# Patient Record
Sex: FEMALE | Race: WHITE | NOT HISPANIC OR LATINO | Employment: FULL TIME | URBAN - METROPOLITAN AREA
[De-identification: names, ages, dates, MRNs, and addresses within clinical notes are randomized per-mention and may not be internally consistent; named-entity substitution may affect disease eponyms.]

---

## 2018-06-09 ENCOUNTER — OFFICE VISIT (OUTPATIENT)
Dept: URGENT CARE | Facility: MEDICAL CENTER | Age: 63
End: 2018-06-09
Payer: COMMERCIAL

## 2018-06-09 VITALS
DIASTOLIC BLOOD PRESSURE: 70 MMHG | OXYGEN SATURATION: 93 % | HEART RATE: 96 BPM | RESPIRATION RATE: 20 BRPM | SYSTOLIC BLOOD PRESSURE: 134 MMHG | TEMPERATURE: 99.7 F

## 2018-06-09 DIAGNOSIS — J45.901 EXACERBATION OF ASTHMA, UNSPECIFIED ASTHMA SEVERITY, UNSPECIFIED WHETHER PERSISTENT: Primary | ICD-10-CM

## 2018-06-09 PROCEDURE — 99203 OFFICE O/P NEW LOW 30 MIN: CPT | Performed by: FAMILY MEDICINE

## 2018-06-09 PROCEDURE — 94640 AIRWAY INHALATION TREATMENT: CPT | Performed by: FAMILY MEDICINE

## 2018-06-09 RX ORDER — ALBUTEROL SULFATE 90 UG/1
AEROSOL, METERED RESPIRATORY (INHALATION)
COMMUNITY
Start: 2013-10-11

## 2018-06-09 RX ORDER — BENZONATATE 100 MG/1
100 CAPSULE ORAL 3 TIMES DAILY PRN
Qty: 20 CAPSULE | Refills: 0 | Status: SHIPPED | OUTPATIENT
Start: 2018-06-09 | End: 2018-10-01

## 2018-06-09 RX ORDER — ALBUTEROL SULFATE 2.5 MG/3ML
2.5 SOLUTION RESPIRATORY (INHALATION) ONCE
Status: COMPLETED | OUTPATIENT
Start: 2018-06-09 | End: 2018-06-09

## 2018-06-09 RX ORDER — FLUTICASONE PROPIONATE 44 UG/1
2 AEROSOL, METERED RESPIRATORY (INHALATION) 2 TIMES DAILY
Qty: 1 INHALER | Refills: 0 | Status: SHIPPED | OUTPATIENT
Start: 2018-06-09 | End: 2021-05-25

## 2018-06-09 RX ORDER — DULOXETIN HYDROCHLORIDE 60 MG/1
20 CAPSULE, DELAYED RELEASE ORAL DAILY
COMMUNITY
End: 2021-05-25

## 2018-06-09 RX ADMIN — ALBUTEROL SULFATE 2.5 MG: 2.5 SOLUTION RESPIRATORY (INHALATION) at 14:50

## 2018-06-09 NOTE — PATIENT INSTRUCTIONS
Patient given albuterol nebulizer treatment in the office  After treatment, she refers to symptomatic improvement  Re auscultation reveals increased air entry with no wheezing  Patient started on Flovent HFA 44 mcg per inhalation -2 puffs twice a day, continue with albuterol inhaler  I also prescribed Tessalon Perles q 8 hours  Recommended patient consider using Flonase spray if congestion worsens  Asthma   WHAT YOU NEED TO KNOW:   Asthma is a lung disease that makes breathing difficult  Chronic inflammation and reactions to triggers narrow the airways in the lungs  Asthma can become life-threatening if it is not managed  DISCHARGE INSTRUCTIONS:   Return to the emergency department if:   · You have severe shortness of breath  · Your lips or nails turn blue or gray  · The skin around your neck and ribs pulls in with each breath  · You have shortness of breath, even after you take your short-term medicine as directed  · Your peak flow numbers are in the red zone of your AAP  Contact your healthcare provider if:   · You run out of medicine before your next refill is due  · Your symptoms get worse  · You need to take more medicine than usual to control your symptoms  · You have questions or concerns about your condition or care  Medicines:   · Medicines  decrease inflammation, open airways, and make it easier to breathe  Medicines may be inhaled, taken as a pill, or injected  Short-term medicines relieve your symptoms quickly  Long-term medicines are used to prevent future attacks  You may also need medicine to help control your allergies  Ask your healthcare provider for more information about the medicine you are given and how to take it safely  · Take your medicine as directed  Contact your healthcare provider if you think your medicine is not helping or if you have side effects  Tell him of her if you are allergic to any medicine   Keep a list of the medicines, vitamins, and herbs you take  Include the amounts, and when and why you take them  Bring the list or the pill bottles to follow-up visits  Carry your medicine list with you in case of an emergency  Follow up with your healthcare provider as directed: You will need to return to make sure your medicine is working and your symptoms are controlled  You may be referred to an asthma specialist  Sydni Cardenas may be asked to keep a record of your peak flow values and bring it with you to your appointments  Write down your questions so you remember to ask them during your visits  Manage your symptoms and prevent future attacks:   · Follow your Asthma Action Plan (AAP)  This is a written plan that you and your healthcare provider create  It explains which medicine you need and when to change doses if necessary  It also explains how you can monitor symptoms and use a peak flow meter  The meter measures how well your lungs are working  · Manage other health conditions , such as allergies, acid reflux, and sleep apnea  · Identify and avoid triggers  These may include pets, dust mites, mold, and cockroaches  · Do not smoke or be around others who smoke  Nicotine and other chemicals in cigarettes and cigars can cause lung damage  Ask your healthcare provider for information if you currently smoke and need help to quit  E-cigarettes or smokeless tobacco still contain nicotine  Talk to your healthcare provider before you use these products  · Ask about the flu vaccine  The flu can make your asthma worse  You may need a yearly flu shot  © 2017 [employer] Information is for End User's use only and may not be sold, redistributed or otherwise used for commercial purposes  All illustrations and images included in CareNotes® are the copyrighted property of [employer] , [email or web address]  or [employer]  The above information is an  only   It is not intended as medical advice for individual conditions or treatments  Talk to your doctor, nurse or pharmacist before following any medical regimen to see if it is safe and effective for you

## 2018-06-09 NOTE — PROGRESS NOTES
[facility]        NAME: Melody Trinidad is a 60 y o  female  : 1955    MRN: [id number]  DATE: 2018  TIME: 3:14 PM    Assessment and Plan   Exacerbation of asthma, unspecified asthma severity, unspecified whether persistent [J45 [id number]]  1  Exacerbation of asthma, unspecified asthma severity, unspecified whether persistent  albuterol inhalation solution 2 5 mg    fluticasone (FLOVENT HFA) 44 mcg/act inhaler    benzonatate (TESSALON PERLES) 100 mg capsule         Patient Instructions       Follow up with PCP in 3-5 days  Proceed to  ER if symptoms worsen  Chief Complaint     Chief Complaint   Patient presents with    Cough     Pt with cough , chest congestion, chest tightness, runny nose, chills , bodyaches worsening for 3 days  Taking albuterol inhaler without improvement of symptoms   Fever         History of Present Illness       Patient complaining of 3 day history of cough shortness of breath or wheezing  She has a known history of asthma and has been using albuterol inhaler every 4-6 hours with no noticeable improvement  She is also taking cough drops with no improvement  Describes nasal congestion and postnasal drip  Review of Systems   Review of Systems   Constitutional: Negative  HENT: Positive for congestion  Respiratory: Positive for cough, shortness of breath and wheezing  Current Medications       Current Outpatient Prescriptions:     albuterol (VENTOLIN HFA) 90 mcg/act inhaler, Inhale, Disp: , Rfl:     DULoxetine (CYMBALTA) 60 mg delayed release capsule, Take 20 mg by mouth daily, Disp: , Rfl:     benzonatate (TESSALON PERLES) 100 mg capsule, Take 1 capsule (100 mg total) by mouth 3 (three) times a day as needed for cough, Disp: 20 capsule, Rfl: 0    fluticasone (FLOVENT HFA) 44 mcg/act inhaler, Inhale 2 puffs 2 (two) times a day Rinse mouth after use , Disp: 1 Inhaler, Rfl: 0  No current facility-administered medications for this visit  Current Allergies     Allergies as of 06/09/2018 - never reviewed   Allergen Reaction Noted    Penicillins  06/08/2012    Sulfamethoxazole-trimethoprim  07/09/2012            The following portions of the patient's history were reviewed and updated as appropriate: allergies, current medications, past family history, past medical history, past social history, past surgical history and problem list      Past Medical History:   Diagnosis Date    Allergic        No past surgical history on file  No family history on file  Medications have been verified  Objective   /70 (BP Location: Right arm, Patient Position: Sitting, Cuff Size: Standard)   Pulse 96   Temp 99 7 °F (37 6 °C) (Tympanic)   Resp 20   SpO2 93%        Physical Exam     Physical Exam   Constitutional: She appears well-developed  HENT:   Hypertrophic turbinates  Pulmonary/Chest: Effort normal  She has wheezes  Nursing note and vitals reviewed

## 2018-10-01 ENCOUNTER — OFFICE VISIT (OUTPATIENT)
Dept: OBGYN CLINIC | Facility: CLINIC | Age: 63
End: 2018-10-01
Payer: COMMERCIAL

## 2018-10-01 VITALS
HEIGHT: 62 IN | SYSTOLIC BLOOD PRESSURE: 120 MMHG | BODY MASS INDEX: 31.5 KG/M2 | WEIGHT: 171.2 LBS | DIASTOLIC BLOOD PRESSURE: 80 MMHG

## 2018-10-01 DIAGNOSIS — N64.4 BREAST PAIN, RIGHT: Primary | ICD-10-CM

## 2018-10-01 PROBLEM — J45.909 ASTHMA: Status: ACTIVE | Noted: 2018-04-19

## 2018-10-01 PROBLEM — R07.9 CHEST PAIN: Status: ACTIVE | Noted: 2018-04-19

## 2018-10-01 PROBLEM — K21.9 GERD (GASTROESOPHAGEAL REFLUX DISEASE): Status: ACTIVE | Noted: 2018-04-19

## 2018-10-01 PROCEDURE — 99202 OFFICE O/P NEW SF 15 MIN: CPT | Performed by: OBSTETRICS & GYNECOLOGY

## 2018-10-01 RX ORDER — MONTELUKAST SODIUM 10 MG/1
10 TABLET ORAL
COMMUNITY

## 2018-10-01 RX ORDER — AMITRIPTYLINE HYDROCHLORIDE 50 MG/1
25 TABLET, FILM COATED ORAL
COMMUNITY
End: 2021-05-25

## 2018-10-01 RX ORDER — ALPRAZOLAM 0.25 MG/1
0.25 TABLET, ORALLY DISINTEGRATING ORAL
COMMUNITY
End: 2021-05-25

## 2018-10-01 NOTE — PROGRESS NOTES
Assessment/Plan:      Diagnoses and all orders for this visit:    Breast pain, right  Comments: The patient was very resistant to the point where she flat at refuses to have a mammogram   Will try diagnostic ultrasound 1st   Orders:  -     US breast right limited (diagnostic); Future    Other orders  -     montelukast (SINGULAIR) 10 mg tablet; Take 10 mg by mouth daily at bedtime  -     ALPRAZolam (NIRAVAM) 0 25 MG dissolvable tablet; Take 0 25 mg by mouth daily at bedtime as needed for anxiety  -     amitriptyline (ELAVIL) 50 mg tablet; Take 25 mg by mouth daily at bedtime          Subjective:     Patient ID: Miguel Chatman is a 60 y o  female  [facility] presents today with a several month history of right breast pain  The patient states the pain started when she caught her right breast between 2 pieces of furniture  Since that time she has had pain off and on in and around the right at areolar area and surrounding tissue  She denies any palpable masses, observable nipple discharge, or skin changes  She refuses to have a mammogram and has not had 1 since age 35 because of the pain that she had gotten at that time  Review of Systems   Constitutional: Negative  Negative for chills and fever  Respiratory: Negative  Cardiovascular: Negative  Gastrointestinal: Negative  Negative for abdominal pain, blood in stool, constipation, diarrhea and nausea  Genitourinary: Negative  Negative for difficulty urinating, dysuria, flank pain, hematuria and urgency  Skin: Negative  Negative for rash and wound  Neurological: Negative  Objective:     Physical Exam  the breasts are symmetric, without palpable mass, skin change or nipple discharge  The right breast is tender especially when palpated between the 5 and 7 o'clock areas  No axillary or clavicular masses are noted

## 2018-10-08 ENCOUNTER — HOSPITAL ENCOUNTER (OUTPATIENT)
Dept: MAMMOGRAPHY | Facility: CLINIC | Age: 63
Discharge: HOME | End: 2018-10-08
Payer: COMMERCIAL

## 2018-10-08 ENCOUNTER — HOSPITAL ENCOUNTER (OUTPATIENT)
Dept: ULTRASOUND IMAGING | Facility: CLINIC | Age: 63
Discharge: HOME | End: 2018-10-08
Payer: COMMERCIAL

## 2018-10-08 DIAGNOSIS — N63.0 LUMP OR MASS IN BREAST: ICD-10-CM

## 2018-10-08 DIAGNOSIS — N64.4 BREAST PAIN: ICD-10-CM

## 2018-10-08 DIAGNOSIS — N64.4 BREAST PAIN, RIGHT: ICD-10-CM

## 2018-10-08 PROCEDURE — 76642 ULTRASOUND BREAST LIMITED: CPT

## 2018-10-08 PROCEDURE — 77066 DX MAMMO INCL CAD BI: CPT

## 2018-10-08 PROCEDURE — G0279 TOMOSYNTHESIS, MAMMO: HCPCS

## 2019-01-21 ENCOUNTER — EVALUATION (OUTPATIENT)
Dept: PHYSICAL THERAPY | Facility: CLINIC | Age: 64
End: 2019-01-21
Payer: COMMERCIAL

## 2019-01-21 DIAGNOSIS — M54.12 CERVICAL RADICULOPATHY: Primary | ICD-10-CM

## 2019-01-21 PROCEDURE — 97112 NEUROMUSCULAR REEDUCATION: CPT

## 2019-01-21 PROCEDURE — 97140 MANUAL THERAPY 1/> REGIONS: CPT

## 2019-01-21 PROCEDURE — 97162 PT EVAL MOD COMPLEX 30 MIN: CPT

## 2019-01-21 NOTE — PROGRESS NOTES
PT Evaluation     Today's date: 2019  Patient name: Marco Antonio Villarreal  :   MRN: [id number]  Referring provider: Amador Saeed DO  Dx:   Encounter Diagnosis     ICD-10-CM    1  Cervical radiculopathy M54 12        Start Time: 1215  Stop Time: 1300  Total time in clinic (min): 45 minutes    Assessment  Assessment details: Patient is a 57 yo female presenting with symptoms consistent with cervical radiculopathy and cervicogenic headaches that started a few weeks ago  Patient presents with decreased cervical AROM, joint hypomobility, decreased cervical strength and decreased muscle endurance  Patient has increased symptoms with dressing, driving, sleeping, looking to the L and working  PT performed sub-occipital release and cervical up-glides and noted improvements in ROM and pain levels  PT will address the noted impairments by performing eccentric strengthening, stretching, posture,  training, and manual technique such as mobilizations and STM to allow the patient to return to her PLOF  PT recommended 2x/week for 4-6 weeks c a good prognosis 2* noted improvements after manual techniques were performed  Impairments: abnormal muscle firing, abnormal or restricted ROM, activity intolerance, impaired physical strength, lacks appropriate home exercise program, pain with function, poor posture  and poor body mechanics    Symptom irritability: moderateUnderstanding of Dx/Px/POC: good   Prognosis: good    Goals  STG: In four weeks the patient will:    1  Be (I) with her HEP  2  Increase shoulder and cervical strength to 4+/5 MMT score to assist c ADLs  3  Increase cervical ROM by 25% to assist c driving and work  LTG: In six weeks, the patient will:    1  Increase FOTO score to 56 to demonstrate improvements in symptoms and function  2  Demonstrate full cervical AROM without pain  3  Perform 30 mins of activity without pain     4  Increase shoulder and cervical strength to 5/5 MMT score to assist c work related activities  5  Lift 10-20# without neck pain  6  Work a 12 hour shift with 1-2/10 pain in the neck and UEs  Plan  Patient would benefit from: skilled physical therapy  Referral necessary: No  Planned modality interventions: cryotherapy and thermotherapy: hydrocollator packs  Planned therapy interventions: abdominal trunk stabilization, joint mobilization, manual therapy, massage, Silveira taping, neuromuscular re-education, patient education, postural training, strengthening, stretching, therapeutic activities, therapeutic exercise, home exercise program, functional ROM exercises and body mechanics training  Frequency: 2x week  Duration in visits: 12  Duration in weeks: 6  Plan of Care beginning date: 2019  Plan of Care expiration date: 3/4/2019  Treatment plan discussed with: patient        Subjective Evaluation    History of Present Illness  Onset date:   Mechanism of injury: 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  Patient noted last week she worked really hard 2* 12 hour day and noted she could not move her arms and started to get a headache  Patient noted an increase in symptoms again after switching from an 8 hour shift to a 12 hour shift  Patient noted increase in symptoms with dressing, bathing, lifting, reaching, cleaning, driving, looking to the L, and sleeping  Patient noted the symptoms initially started in  after turning her head really quick and almost past out  Patient had dizziness, went to PT and had a MRI which noted bulging disks in the cervical spine  PT did help and she continued with a HEP after D/C     Recurrent probem    Quality of life: good    Pain  Current pain rating: 3  At best pain rating: 3  At worst pain ratin  Location: (B) UE, CT junction, base of skull to eyes, L UE > R UE  Quality: dull ache, sharp and burning (numbness in 4th and 5th digits (B) UEs)  Relieving factors: medications  Aggravating factors: overhead activity and lifting  Progression: worsening    Social Support  Steps to enter house: yes (4 BRADLY)  Stairs in house: no   Lives in: [place]  Lives with: alone    Employment status: working ()  Hand dominance: right  Exercise history: none      Diagnostic Tests  MRI studies: abnormal (bulging discs)  Treatments  Previous treatment: physical therapy  Patient Goals  Patient goals for therapy: increased motion, decreased pain, increased strength, independence with ADLs/IADLs and return to sport/leisure activities  Patient goal: "stronger muscles in arms and a release of pain "        Objective     Postural Observations  Seated posture: fair  Standing posture: fair  Correction of posture: makes symptoms better        Palpation   Left   Tenderness of the cervical interspinals, cervical paraspinals, levator scapulae, suboccipitals and upper trapezius  Trigger point to suboccipitals  Right Tenderness of the cervical interspinals, cervical paraspinals, levator scapulae, suboccipitals and upper trapezius  Trigger point to suboccipitals  Tenderness   Cervical Spine   Tenderness in the facet joint, spinous process, left transverse process and right transverse process  Neurological Testing     Sensation   Cervical/Thoracic   Left   Intact: light touch  Diminished: light touch    Right   Intact: light touch    Comments   Left light touch: C5, C7 diminished       Active Range of Motion   Cervical/Thoracic Spine   Cervical    Flexion: Neck active flexion: 50% with pain  Extension: Neck active extension: 25% with pain  Left lateral flexion: Neck active lateral bend left: 50% with pain  Right lateral flexion: Neck active lateral bend right: 75% with pain  Left rotation: Neck active rotation left: 50% with pain  Right rotation: Neck active rotation right: 75% with pain    Additional Active Range of Motion Details  (B) UE shoulder AROM was WNL and not painful  Joint Play Hypomobile: C2, C3, C4 and C5 Pain: C2, C3, C4 and C5     Strength/Myotome Testing   Cervical Spine   Neck extension: 3+  Neck flexion: 3+    Left Shoulder     Planes of Motion   Flexion: 4+   Extension: 4+   Abduction: 4+   External rotation at 0°: 3+   Internal rotation at 0°: 4+     Right Shoulder     Planes of Motion   Flexion: 4+   Extension: 4+   Abduction: 4+   External rotation at 0°: 3+   Internal rotation at 0°: 4+     Left Elbow   Flexion: 4+  Extension: 4+    Right Elbow   Flexion: 4+  Extension: 4+    Left Wrist/Hand   Wrist extension: 4+  Wrist flexion: 4+    Right Wrist/Hand   Wrist extension: 4+  Wrist flexion: 4+    Tests   Cervical     Left   Positive cervical distraction  Right   Positive cervical distraction  Left Shoulder   Positive ULTT4  Right Shoulder   Positive ULTT4         Flowsheet Rows      Most Recent Value   PT/OT G-Codes   Current Score  47   Projected Score  56   FOTO information reviewed  Yes   Assessment Type  Evaluation   G code set  Other PT/OT Primary          Precautions: none    BP on 1/21/19: 129/82 mmHg, HR: 70 bpm    Daily Treatment Diary     Manual  1/21            Sub-occipital release 5'            Cervical manual traction 3'            Cervical upglides (C3-4) 2'                                          Exercise Diary  1/21            UBE nv            Chin tucks X20, 5" hold            Scapular retractions X20, 5" hold            Ulnar nerve glides x20 ea            Cervical ROM nv            Serratus punches nv            Half foam roll pec stretch nv                                                                                                                                                                                         Modalities  1/21            HP/CP PRN np

## 2019-01-21 NOTE — LETTER
2019    Kaleb NyDO  [address]    Patient: Pedro Yang   YOB: 1955   Date of Visit: 2019     Encounter Diagnosis     ICD-10-CM    1  Cervical radiculopathy M54 12        Dear Dr Valeriano Jones:    Please review the attached Plan of Care from [facility] recent visit  Please verify that you agree therapy should continue by signing the attached document and sending it back to our office  If you have any questions or concerns, please don't hesitate to call  Sincerely,    Nimo Cespedes, PT      Referring Provider:      I certify that I have read the below Plan of Care and certify the need for these services furnished under this plan of treatment while under my care  Kaleb NyDO  [address] [address]: [phone]          PT Evaluation     Today's date: 2019  Patient name: Pedro Yang  : 1955  MRN: [id number]  Referring provider: Maria Elena Rodrigues DO  Dx:   Encounter Diagnosis     ICD-10-CM    1  Cervical radiculopathy M54 12        Start Time: 1215  Stop Time: 1300  Total time in clinic (min): 45 minutes    Assessment  Assessment details: Patient is a 59 yo female presenting with symptoms consistent with cervical radiculopathy and cervicogenic headaches that started a few weeks ago  Patient presents with decreased cervical AROM, joint hypomobility, decreased cervical strength and decreased muscle endurance  Patient has increased symptoms with dressing, driving, sleeping, looking to the L and working  PT performed sub-occipital release and cervical up-glides and noted improvements in ROM and pain levels  PT will address the noted impairments by performing eccentric strengthening, stretching, posture,  training, and manual technique such as mobilizations and STM to allow the patient to return to her PLOF   PT recommended 2x/week for 4-6 weeks c a good prognosis 2* noted improvements after manual techniques were performed  Impairments: abnormal muscle firing, abnormal or restricted ROM, activity intolerance, impaired physical strength, lacks appropriate home exercise program, pain with function, poor posture  and poor body mechanics    Symptom irritability: moderateUnderstanding of Dx/Px/POC: good   Prognosis: good    Goals  STG: In four weeks the patient will:    1  Be (I) with her HEP  2  Increase shoulder and cervical strength to 4+/5 MMT score to assist c ADLs  3  Increase cervical ROM by 25% to assist c driving and work  LTG: In six weeks, the patient will:    1  Increase FOTO score to 56 to demonstrate improvements in symptoms and function  2  Demonstrate full cervical AROM without pain  3  Perform 30 mins of activity without pain  4  Increase shoulder and cervical strength to 5/5 MMT score to assist c work related activities  5  Lift 10-20# without neck pain  6  Work a 12 hour shift with 1-2/10 pain in the neck and UEs  Plan  Patient would benefit from: skilled physical therapy  Referral necessary: No  Planned modality interventions: cryotherapy and thermotherapy: hydrocollator packs  Planned therapy interventions: abdominal trunk stabilization, joint mobilization, manual therapy, massage, Silveira taping, neuromuscular re-education, patient education, postural training, strengthening, stretching, therapeutic activities, therapeutic exercise, home exercise program, functional ROM exercises and body mechanics training  Frequency: 2x week  Duration in visits: 12  Duration in weeks: 6  Plan of Care beginning date: 1/21/2019  Plan of Care expiration date: 3/4/2019  Treatment plan discussed with: patient        Subjective Evaluation    History of Present Illness  Onset date: 2015  Mechanism of injury: 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   Patient noted last week she worked really hard 2* 12 hour day and noted she could not move her arms and started to get a headache  Patient noted an increase in symptoms again after switching from an 8 hour shift to a 12 hour shift  Patient noted increase in symptoms with dressing, bathing, lifting, reaching, cleaning, driving, looking to the L, and sleeping  Patient noted the symptoms initially started in  after turning her head really quick and almost past out  Patient had dizziness, went to PT and had a MRI which noted bulging disks in the cervical spine  PT did help and she continued with a HEP after D/C  Recurrent probem    Quality of life: good    Pain  Current pain rating: 3  At best pain rating: 3  At worst pain ratin  Location: (B) UE, CT junction, base of skull to eyes, L UE > R UE  Quality: dull ache, sharp and burning (numbness in 4th and 5th digits (B) UEs)  Relieving factors: medications  Aggravating factors: overhead activity and lifting  Progression: worsening    Social Support  Steps to enter house: yes (4 BRADLY)  Stairs in house: no   Lives in: [employer]  Lives with: alone    Employment status: working ()  Hand dominance: right  Exercise history: none      Diagnostic Tests  MRI studies: abnormal (bulging discs)  Treatments  Previous treatment: physical therapy  Patient Goals  Patient goals for therapy: increased motion, decreased pain, increased strength, independence with ADLs/IADLs and return to sport/leisure activities  Patient goal: "stronger muscles in arms and a release of pain "        Objective     Postural Observations  Seated posture: fair  Standing posture: fair  Correction of posture: makes symptoms better        Palpation   Left   Tenderness of the cervical interspinals, cervical paraspinals, levator scapulae, suboccipitals and upper trapezius  Trigger point to suboccipitals       Right Tenderness of the cervical interspinals, cervical paraspinals, levator scapulae, suboccipitals and upper trapezius  Trigger point to suboccipitals  Tenderness   Cervical Spine   Tenderness in the facet joint, spinous process, left transverse process and right transverse process  Neurological Testing     Sensation   Cervical/Thoracic   Left   Intact: light touch  Diminished: light touch    Right   Intact: light touch    Comments   Left light touch: C5, C7 diminished  Active Range of Motion   Cervical/Thoracic Spine   Cervical    Flexion: Neck active flexion: 50% with pain  Extension: Neck active extension: 25% with pain  Left lateral flexion: Neck active lateral bend left: 50% with pain  Right lateral flexion: Neck active lateral bend right: 75% with pain  Left rotation: Neck active rotation left: 50% with pain  Right rotation: Neck active rotation right: 75% with pain    Additional Active Range of Motion Details  (B) UE shoulder AROM was WNL and not painful  Joint Play Hypomobile: C2, C3, C4 and C5 Pain: C2, C3, C4 and C5     Strength/Myotome Testing   Cervical Spine   Neck extension: 3+  Neck flexion: 3+    Left Shoulder     Planes of Motion   Flexion: 4+   Extension: 4+   Abduction: 4+   External rotation at 0°:  3+   Internal rotation at 0°:  4+     Right Shoulder     Planes of Motion   Flexion: 4+   Extension: 4+   Abduction: 4+   External rotation at 0°:  3+   Internal rotation at 0°:  4+     Left Elbow   Flexion: 4+  Extension: 4+    Right Elbow   Flexion: 4+  Extension: 4+    Left Wrist/Hand   Wrist extension: 4+  Wrist flexion: 4+    Right Wrist/Hand   Wrist extension: 4+  Wrist flexion: 4+    Tests   Cervical     Left   Positive cervical distraction  Right   Positive cervical distraction  Left Shoulder   Positive ULTT4  Right Shoulder   Positive ULTT4         Flowsheet Rows      Most Recent Value   PT/OT G-Codes   Current Score  47   Projected Score  56   FOTO information reviewed  Yes   Assessment Type  Evaluation   G code set  Other PT/OT Primary Precautions: none    BP on 1/21/19: 129/82 mmHg, HR: 70 bpm    Daily Treatment Diary     Manual  1/21            Sub-occipital release 5'            Cervical manual traction 3'            Cervical upglides (C3-4) 2'                                          Exercise Diary  1/21            UBE nv            Chin tucks X20, 5" hold            Scapular retractions X20, 5" hold            Ulnar nerve glides x20 ea            Cervical ROM nv            Serratus punches nv            Half foam roll pec stretch nv                                                                                                                                                                                         Modalities  1/21            HP/CP PRN np

## 2019-01-23 ENCOUNTER — OFFICE VISIT (OUTPATIENT)
Dept: PHYSICAL THERAPY | Facility: CLINIC | Age: 64
End: 2019-01-23
Payer: COMMERCIAL

## 2019-01-23 ENCOUNTER — TRANSCRIBE ORDERS (OUTPATIENT)
Dept: PHYSICAL THERAPY | Facility: CLINIC | Age: 64
End: 2019-01-23

## 2019-01-23 DIAGNOSIS — M54.12 CERVICAL RADICULOPATHY: Primary | ICD-10-CM

## 2019-01-23 PROCEDURE — 97112 NEUROMUSCULAR REEDUCATION: CPT

## 2019-01-23 PROCEDURE — 97140 MANUAL THERAPY 1/> REGIONS: CPT

## 2019-01-23 PROCEDURE — 97110 THERAPEUTIC EXERCISES: CPT

## 2019-01-23 NOTE — PROGRESS NOTES
Daily Note     Today's date: 2019  Patient name: Pedro Yang  :   MRN: [id number]  Referring provider: Maria Elena Rodrigues DO  Dx:   Encounter Diagnosis     ICD-10-CM    1  Cervical radiculopathy M54 12      MH 5:30-5:40  1:1 with PTA CR 5:40- 6:40  Subjective: Increased pain following I E  Reports compliance with HEP offering no questions or concerns  HA and B/L C-S pain 4/10  Objective: See treatment diary below      Assessment: Tolerated treatment fair  Patient demonstrated fatigue post treatment, exhibited good technique with therapeutic exercises and would benefit from continued PT  Limited tolerance activity due to pain  Focused on postural awareness and pain relief  Continues with positive response to SOR denying DURBIN post treatment         Plan: Continue per plan of care          Precautions: none     BP on 19: 129/82 mmHg, HR: 70 bpm     Daily Treatment Diary      Manual                     Sub-occipital release 5'  5 mins                   Cervical manual traction 3'  NP                   Cervical upglides (C3-4) 2'  NP                    STM    10 mins                                                 Exercise Diary                     UBE nv  retro  5 mins                   Chin tucks X20, 5" hold  5"  20                   Scapular retractions X20, 5" hold  5"  20                   Ulnar nerve glides x20 ea  20 ea                    Cervical ROM nv  10 ea                    Serratus punches nv                     Half foam roll pec stretch nv                                                                                                                                                                                                                            Modalities                     HP/CP PRN np  MH pre  10 mins

## 2019-01-28 ENCOUNTER — OFFICE VISIT (OUTPATIENT)
Dept: PHYSICAL THERAPY | Facility: CLINIC | Age: 64
End: 2019-01-28
Payer: COMMERCIAL

## 2019-01-28 DIAGNOSIS — M54.12 CERVICAL RADICULOPATHY: Primary | ICD-10-CM

## 2019-01-28 PROCEDURE — 97110 THERAPEUTIC EXERCISES: CPT

## 2019-01-28 PROCEDURE — 97112 NEUROMUSCULAR REEDUCATION: CPT

## 2019-01-28 PROCEDURE — 97140 MANUAL THERAPY 1/> REGIONS: CPT

## 2019-01-28 NOTE — PROGRESS NOTES
Daily Note     Today's date: 2019  Patient name: Marium Nayak  :   MRN: [id number]  Referring provider: Linwood Morales DO  Dx:   Encounter Diagnosis     ICD-10-CM    1  Cervical radiculopathy M54 12        Start Time: 1015  Stop Time: 1045  Total time in clinic (min): 30 minutes    Subjective: Patient noted she was sore after last session  Patient noted her headaches have been less since starting PT  Objective: See treatment diary below       Precautions: none     BP on 19: 129/82 mmHg, HR: 70 bpm     Daily Treatment Diary      Manual                   Sub-occipital release 5'  5 mins  5'                 Cervical manual traction 3'  NP                   Cervical upglides/downglides (C3-4) 2'  NP  5'                  STM    10 mins                                                 Exercise Diary                   UBE nv  retro  5 mins  retro  5 mins                 Chin tucks X20, 5" hold  5"  20  x20  5" hold                 Scapular retractions X20, 5" hold  5"  20  x20  5" hold                 Ulnar nerve glides x20 ea  20 ea   x20 ea                 Cervical ROM nv  10 ea   np                 Serratus punches nv    x20  3" hold                 Full foam roll pec stretch nv    5x 15" ea  straight and ER                   Rows & extensions     GTB  2x10 ea  5" hold                                                                                                                                                                                               Modalities                   HP/CP PRN np  MH pre  10 mins  np                                              Assessment: PT introduced pec stretch and serratus punches to assist c posture and scapular stability  Patient noted improvements in symptoms after manual techniques and exercises were performed  PT added serratus punches and pec stretch to HEP   Patient would benefit from continued PT to allow the patient to return to her PLOF  Plan: Continue per plan of care

## 2019-01-30 ENCOUNTER — OFFICE VISIT (OUTPATIENT)
Dept: PHYSICAL THERAPY | Facility: CLINIC | Age: 64
End: 2019-01-30
Payer: COMMERCIAL

## 2019-01-30 DIAGNOSIS — M54.12 CERVICAL RADICULOPATHY: Primary | ICD-10-CM

## 2019-01-30 PROCEDURE — 97110 THERAPEUTIC EXERCISES: CPT

## 2019-01-30 PROCEDURE — 97112 NEUROMUSCULAR REEDUCATION: CPT

## 2019-01-30 PROCEDURE — 97140 MANUAL THERAPY 1/> REGIONS: CPT

## 2019-01-30 NOTE — PROGRESS NOTES
Daily Note     Today's date: 2019  Patient name: Antolin Suazo  : [birth year]  MRN: [id number]  Referring provider: Deon Esquivel DO  Dx:   Encounter Diagnosis     ICD-10-CM    1  Cervical radiculopathy M54 12        Start Time: 815  Stop Time: 915  Total time in clinic (min): 60 minutes    Subjective: Patient noted she was sore after last session  Patient noted she started to get a headache last night, however after the exercises were performed, the headache decreased  Patient noted R shoulder pain at the start of the session         Objective: See treatment diary below      Precautions: none     BP on 19: 129/82 mmHg, HR: 70 bpm     Daily Treatment Diary      Manual                 Sub-occipital release 5'  5 mins  5'                 Cervical manual traction 3'  NP                   Cervical upglides/downglides (C3-4) 2'  NP  5'                  STM    10 mins    IASTM to R biceps  10'                                             Exercise Diary                 UBE nv  retro  5 mins  retro  5 mins  retro'5 mins               Chin tucks X20, 5" hold  5"  20  x20  5" hold  x30  5" hold               Scapular retractions X20, 5" hold  5"  20  x20  5" hold X30,  5" hold               Ulnar nerve glides x20 ea  20 ea   x20 ea  x30               Cervical ROM nv  10 ea   np  D/C               Serratus punches nv    x20  3" hold  2#  x20  3" hold               Full foam roll pec stretch nv    5x 15" ea  straight and ER  5x15"  ea                 Rows & extensions     GTB  2x10 ea  5" hold  GTB  3x10  5" hold                Shoulder IR/ER       OTB  2x10                Standing biceps stretch       5x15"                                                                                                                                        HEP: pec stretch, chin tuck, nerve glides, scapular retraction, serratus punches, biceps stretch     Modalities    1/30               HP/CP PRN np  MH pre  10 mins  np  np                                                Assessment: PT performed IASTM to R biceps and added a biceps stretch to decrease pain in the R shoulder  Patient demonstrated improvements in strength 2* increased reps  Patient would benefit from continued PT to allow the patient to return to her PLOF  Plan: Continue per plan of care

## 2019-02-04 ENCOUNTER — OFFICE VISIT (OUTPATIENT)
Dept: PHYSICAL THERAPY | Facility: CLINIC | Age: 64
End: 2019-02-04
Payer: COMMERCIAL

## 2019-02-04 DIAGNOSIS — M54.12 CERVICAL RADICULOPATHY: Primary | ICD-10-CM

## 2019-02-04 PROCEDURE — 97110 THERAPEUTIC EXERCISES: CPT

## 2019-02-04 PROCEDURE — 97140 MANUAL THERAPY 1/> REGIONS: CPT

## 2019-02-04 PROCEDURE — 97112 NEUROMUSCULAR REEDUCATION: CPT

## 2019-02-04 NOTE — PROGRESS NOTES
Daily Note     Today's date: 2019  Patient name: Yvette Monique  : [birth year]  MRN: [id number]  Referring provider: Boris Javier DO  Dx:   Encounter Diagnosis     ICD-10-CM    1  Cervical radiculopathy M54 12        Start Time: 1115  Stop Time: 1200  Total time in clinic (min): 45 minutes    Subjective: Patient noted on Saturday she was very busy at work and lifted a lot of heavy products   Patient noted her pain was not the best      Objective: See treatment diary below      Precautions: none     BP on 19: 129/82 mmHg, HR: 70 bpm     Daily Treatment Diary      Manual    2/4             Sub-occipital release 5'  5 mins  5'    10'             Cervical manual traction 3'  NP                   Cervical upglides/downglides (C3-4) 2'  NP  5'                  STM    10 mins    IASTM to R biceps  10'                                             Exercise Diary    2/4             UBE nv Valeriy Favre  5 mins  retro  5 mins  retro'5 mins  retro  5'             Chin tucks X20, 5" hold  5"  20  x20  5" hold  x30  5" hold  x30  5" hold             Scapular retractions X20, 5" hold  5"  20  x20  5" hold X30,  5" hold HEP             Ulnar nerve glides x20 ea  20 ea   x20 ea  x30  HEP             Cervical ROM nv  10 ea   np  D/C               Serratus punches nv    x20  3" hold  2#  x20  3" hold  on foam roll  2#  x30  3"             Full foam roll pec stretch nv    5x 15" ea  straight and ER  5x15"  ea  5x15" ea               Rows & extensions     GTB  2x10 ea  5" hold  GTB  3x10  5" hold  nv              Shoulder IR/ER       OTB  2x10  OTB  2x10 ea UE              Standing biceps stretch       5x15"  HEP              no monies          GTB  2x10  5" hold              Prone I, T          x20 ea  3" hold                                                                                      HEP: pec stretch, chin tuck, nerve glides, scapular retraction, serratus punches, biceps stretch, no monies     Modalities  1/21 1/23 1/28 1/30               HP/CP PRN np [id number] Huber pre  10 mins  np  np                                                Assessment: PT introduced prone I, Ts and no monies to assist c scapular strengthening during functional activities  Patient noted no soreness, however no pain  PT added no monies to her HEP  PT noted improvements in sub-occipital musculature during release  Patient would benefit from continued PT to allow the patient to return to her PLOF  Plan: Continue per plan of care

## 2019-02-06 ENCOUNTER — OFFICE VISIT (OUTPATIENT)
Dept: PHYSICAL THERAPY | Facility: CLINIC | Age: 64
End: 2019-02-06
Payer: COMMERCIAL

## 2019-02-06 DIAGNOSIS — M54.12 CERVICAL RADICULOPATHY: Primary | ICD-10-CM

## 2019-02-06 PROCEDURE — 97112 NEUROMUSCULAR REEDUCATION: CPT

## 2019-02-06 PROCEDURE — 97140 MANUAL THERAPY 1/> REGIONS: CPT

## 2019-02-06 PROCEDURE — 97110 THERAPEUTIC EXERCISES: CPT

## 2019-02-06 NOTE — PROGRESS NOTES
Daily Note     Today's date: 2019  Patient name: Yvette Monique  :   MRN: [id number]  Referring provider: Boris Javier DO  Dx:   Encounter Diagnosis     ICD-10-CM    1  Cervical radiculopathy M54 12        Start Time: 46  Stop Time: 1800  Total time in clinic (min): 45 minutes    Subjective: Patient noted after last session she performed stretching and felt good afterwards  Patient noted she had a hard day at work and has a slight headache         Objective: See treatment diary below      Precautions: none     BP on 19: 129/82 mmHg, HR: 70 bpm     Daily Treatment Diary      Manual    2/  2/6           Sub-occipital release 5'  5 mins  5'    10'  10'           Cervical manual traction 3'  NP                   Cervical upglides/downglides (C3-4) 2'  NP  5'                  STM    10 mins    IASTM to R biceps  10'                                             Exercise Diary    2/  2/6           UBE [place] Bjorn Favre  5 mins  retro  5 mins  retro'5 mins  retro  5'  retro  5'           Chin tucks X20, 5" hold  5"  20  x20  5" hold  x30  5" hold  x30  5" hold  x30  5" hold           Scapular retractions X20, 5" hold  5"  20  x20  5" hold X30,  5" hold HEP             Ulnar nerve glides x20 ea  20 ea   x20 ea  x30  HEP             Cervical ROM nv  10 ea   np  D/C               Serratus punches nv    x20  3" hold  2#  x20  3" hold  on foam roll  2#  x30  3"  on foam  3#  x30  5"           Full foam roll pec stretch nv    5x 15" ea  straight and ER  5x15"  ea  5x15" ea  5x15" ea             Rows & extensions     GTB  2x10 ea  5" hold  GTB  3x10  5" hold  nv  GTB  3x10  5" hold            Shoulder IR/ER       OTB  2x10  OTB  2x10 ea UE  GTB  2x10 ea            Standing biceps stretch       5x15"  HEP              no monies          GTB  2x10  5" hold  GTB  2x10  5" hold            Prone I, T          x20 ea  3" hold  x20  Ea  3" hold            Quad plus            nv           PNF D2 Flexion             nv            Quad alt UEs           nv            HEP: pec stretch, chin tuck, nerve glides, scapular retraction, serratus punches, biceps stretch, no monies, rows and extensions     Modalities  1/21 1/23 1/28 1/30               HP/CP PRN np  MH pre  10 mins  np  np                                              Assessment: Patient demonstrated improvements in strength 2* increased resistance, however continues to be limited in endurance  Patient required min VCs for shoulder ER  Patient noted improvements in pain levels at the end of the session  Patient would benefit from continued PT to allow the patient to return to her PLOF  Plan: Continue per plan of care

## 2019-02-11 ENCOUNTER — APPOINTMENT (OUTPATIENT)
Dept: PHYSICAL THERAPY | Facility: CLINIC | Age: 64
End: 2019-02-11
Payer: COMMERCIAL

## 2019-02-13 ENCOUNTER — OFFICE VISIT (OUTPATIENT)
Dept: PHYSICAL THERAPY | Facility: CLINIC | Age: 64
End: 2019-02-13
Payer: COMMERCIAL

## 2019-02-13 DIAGNOSIS — M54.12 CERVICAL RADICULOPATHY: Primary | ICD-10-CM

## 2019-02-13 PROCEDURE — 97140 MANUAL THERAPY 1/> REGIONS: CPT | Performed by: PHYSICAL THERAPY ASSISTANT

## 2019-02-13 NOTE — PROGRESS NOTES
Daily Note     Today's date: 2019  Patient name: Kenroy Botello  :   MRN: [id number]  Referring provider: Lila Gallagher DO  Dx:   Encounter Diagnosis     ICD-10-CM    1  Cervical radiculopathy M54 12                   Subjective: Pt notes she had a stressful weekend and stressors continue resulting in increased neck pain          Objective: See treatment diary below      Precautions: none     BP on 19: 129/82 mmHg, HR: 70 bpm     Daily Treatment Diary      Manual    2/  2/         Sub-occipital release 5'  5 mins  5'    10'  10'  10'         Cervical manual traction 3'  NP         STM cerv paraspinals/UT         Cervical upglides/downglides (C3-4) 2'  NP  5'                  STM    10 mins    IASTM to R biceps  10'                                             Exercise Diary    2  2/         UBE [place] Knife Arvel Berth  5 mins  retro  5 mins  retro'5 mins  retro  5'  retro  5'  retro  5'         Chin tucks X20, 5" hold  5"  20  x20  5" hold  x30  5" hold  x30  5" hold  x30  5" hold           Scapular retractions X20, 5" hold  5"  20  x20  5" hold X30,  5" hold HEP             Ulnar nerve glides x20 ea  20 ea   x20 ea  x30  HEP             Cervical ROM nv  10 ea   np  D/C               Serratus punches nv    x20  3" hold  2#  x20  3" hold  on foam roll  2#  x30  3"  on foam  3#  x30  5"           Full foam roll pec stretch nv    5x 15" ea  straight and ER  5x15"  ea  5x15" ea  5x15" ea             Rows & extensions     GTB  2x10 ea  5" hold  GTB  3x10  5" hold  nv  GTB  3x10  5" hold            Shoulder IR/ER       OTB  2x10  OTB  2x10 ea UE  GTB  2x10 ea            Standing biceps stretch       5x15"  HEP              no monies          GTB  2x10  5" hold  GTB  2x10  5" hold            Prone I, T          x20 ea  3" hold  x20  Ea  3" hold            Quad plus            nv           PNF D2 Flexion             nv            Quad alt UEs           nv            HEP: pec stretch, chin tuck, nerve glides, scapular retraction, serratus punches, biceps stretch, no monies, rows and extensions     Modalities  1/21 1/23 1/28 1/30               HP/CP PRN np  MH pre  10 mins  np  np                                              Assessment: Pt reporting HA and increased cervical pain today  Requesting just manuals this session  STM performed to cervical paraspinals, UT and suboccipital muscles with pt reporting decreased pain and no HA following manuals    Patient would benefit from continued PT to allow the patient to return to her PLOF  Plan: Continue per plan of care

## 2019-02-18 ENCOUNTER — OFFICE VISIT (OUTPATIENT)
Dept: PHYSICAL THERAPY | Facility: CLINIC | Age: 64
End: 2019-02-18
Payer: COMMERCIAL

## 2019-02-18 DIAGNOSIS — M54.12 CERVICAL RADICULOPATHY: Primary | ICD-10-CM

## 2019-02-18 PROCEDURE — 97112 NEUROMUSCULAR REEDUCATION: CPT

## 2019-02-18 PROCEDURE — 97140 MANUAL THERAPY 1/> REGIONS: CPT

## 2019-02-18 PROCEDURE — 97110 THERAPEUTIC EXERCISES: CPT

## 2019-02-18 NOTE — PROGRESS NOTES
Daily Note     Today's date: 2019  Patient name: Logan Jain  :   MRN: [id number]  Referring provider: Kelly Pabon DO  Dx:   Encounter Diagnosis     ICD-10-CM    1  Cervical radiculopathy M54 12        Start Time: 1030  Stop Time: 1115  Total time in clinic (min): 45 minutes    Subjective: Patient noted she is starting to notice an improvement in pain levels         Objective: See treatment diary below      Precautions: none     BP on 19: 129/82 mmHg, HR: 70 bpm     Daily Treatment Diary      Manual    2/4  2/       Sub-occipital release 5'  5 mins  5'    10'  10'  10'  10'       Cervical manual traction 3'  NP         STM cerv paraspinals/UT         Cervical upglides/downglides (C3-4) 2'  NP  5'                  STM    10 mins    IASTM to R biceps  10'                                             Exercise Diary    2/  2/       UBE retro nv  retro  5 mins  retro  5 mins  retro'5 mins  retro  5'  retro  5'  retro  5'  5'       Chin tucks X20, 5" hold  5"  20  x20  5" hold  x30  5" hold  x30  5" hold  x30  5" hold    x30  5" hold       Scapular retractions X20, 5" hold  5"  20  x20  5" hold X30,  5" hold HEP             Ulnar nerve glides x20 ea  20 ea   x20 ea  x30  HEP             Cervical ROM nv  10 ea   np  D/C               Serratus punches nv    x20  3" hold  2#  x20  3" hold  on foam roll  2#  x30  3"  on foam  3#  x30  5"     5#  2x10  3" hold       Full foam roll pec stretch nv    5x 15" ea  straight and ER  5x15"  ea  5x15" ea  5x15" ea    5x15" ea         Rows & extensions     GTB  2x10 ea  5" hold  GTB  3x10  5" hold  nv  GTB  3x10  5" hold    BTB  3x10  5" hold        Shoulder IR/ER       OTB  2x10  OTB  2x10 ea UE  GTB  2x10 ea    GTB  2x10  ea        Standing biceps stretch       5x15"  HEP              no monies          GTB  2x10  5" hold  GTB  2x10  5" hold    GTB  3x10  5" hold        Prone I, T          x20 ea  3" hold  x20  Ea  3" hold    2x10   ea  3" hold        Quad plus            nv    2x10       PNF D2 Flexion             nv    OTB  2x10 ea        Quad alt UEs           nv            HEP: pec stretch, chin tuck, nerve glides, scapular retraction, serratus punches, biceps stretch, no monies, rows and extensions     Modalities  1/21 1/23 1/28 1/30               HP/CP PRN np  MH pre  10 mins  np  np                                                Assessment: PT introduced quad plus for serratus activation at today's session  Patient noted no increase in pain levels with new exercises  Patient continues to improve with shoulder IR/ER exercises 2* increased strength  Patient noted improvements at the end of the session  Patient would benefit from continued PT to allow the patient to return to her PLOF  Plan: Continue per plan of care

## 2019-02-20 ENCOUNTER — APPOINTMENT (OUTPATIENT)
Dept: PHYSICAL THERAPY | Facility: CLINIC | Age: 64
End: 2019-02-20
Payer: COMMERCIAL

## 2019-02-22 ENCOUNTER — APPOINTMENT (OUTPATIENT)
Dept: PHYSICAL THERAPY | Facility: CLINIC | Age: 64
End: 2019-02-22
Payer: COMMERCIAL

## 2019-02-25 ENCOUNTER — OFFICE VISIT (OUTPATIENT)
Dept: PHYSICAL THERAPY | Facility: CLINIC | Age: 64
End: 2019-02-25
Payer: COMMERCIAL

## 2019-02-25 DIAGNOSIS — M54.12 CERVICAL RADICULOPATHY: Primary | ICD-10-CM

## 2019-02-25 PROCEDURE — 97140 MANUAL THERAPY 1/> REGIONS: CPT

## 2019-02-25 PROCEDURE — 97112 NEUROMUSCULAR REEDUCATION: CPT

## 2019-02-25 PROCEDURE — 97110 THERAPEUTIC EXERCISES: CPT

## 2019-02-25 NOTE — PROGRESS NOTES
Daily Note     Today's date: 2019  Patient name: Landon Cristina  :   MRN: [id number]  Referring provider: Kindra Holloway DO  Dx:   Encounter Diagnosis     ICD-10-CM    1   Cervical radiculopathy M54 12        Start Time: 1000  Stop Time: 1100  Total time in clinic (min): 60 minutes    Subjective: Patient noted she is feeling good, except yesterday she did a lot of looking down on a computer screen and noted increased neck pain when rotating to the L        Objective: See treatment diary below      Precautions: none     BP on 19: 129/82 mmHg, HR: 70 bpm     Daily Treatment Diary      Manual    2/  2/     Sub-occipital release 5'  5 mins  5'    10'  10'  10'  10'  10'     Cervical manual traction 3'  NP         STM cerv paraspinals/UT         Cervical upglides/downglides (C3-4) 2'  NP  5'                  STM    10 mins    IASTM to R biceps  10'                                             Exercise Diary    2/4  2/6       Rajan Bryant retro nv  retro  5 mins  retro  5 mins  retro'5 mins  retro  5'  retro  5'  retro  5'  5'  5'     Chin tucks X20, 5" hold  5"  20  x20  5" hold  x30  5" hold  x30  5" hold  x30  5" hold    x30  5" hold  2x20  5" hold     Scapular retractions X20, 5" hold  5"  20  x20  5" hold X30,  5" hold HEP             Ulnar nerve glides x20 ea  20 ea   x20 ea  x30  HEP             Cervical ROM nv  10 ea   np  D/C               Serratus punches nv    x20  3" hold  2#  x20  3" hold  on foam roll  2#  x30  3"  on foam  3#  x30  5"     5#  2x10  3" hold  5#  2x10  3" hold     Full foam roll pec stretch nv    5x 15" ea  straight and ER  5x15"  ea  5x15" ea  5x15" ea    5x15" ea  5x15" ea       Rows & extensions     GTB  2x10 ea  5" hold  GTB  3x10  5" hold  nv  GTB  3x10  5" hold    BTB  3x10  5" hold  BTB  3x10  5" hold      Shoulder IR/ER       OTB  2x10  OTB  2x10 ea UE  GTB  2x10 ea    GTB  2x10  ea  GTB  2x10  ea      Standing biceps stretch       5x15"  HEP              no monies          GTB  2x10  5" hold  GTB  2x10  5" hold    GTB  3x10  5" hold  GTB  3x10  5" hold      Prone I, T          x20 ea  3" hold  x20  Ea  3" hold    2x10   ea  3" hold  2x10  Ea  5" hold      Quad plus            nv    2x10  standing serratus punch  GTB  2x10  5" hold     PNF D2 Flexion             nv    OTB  2x10 ea  OTB  2x10 ea                       HEP: pec stretch, chin tuck, nerve glides, scapular retraction, serratus punches, biceps stretch, no monies, rows and extensions     Modalities  1/21 1/23 1/28 1/30               HP/CP PRN np  MH pre  10 mins  np  np                                                    Assessment: Patient demonstrated improvements with strength 2* less pain in the past week and improved form with exercises  After manual techniques the patient was able to turn her head with improved motion  Patient would benefit from continued PT to allow the patient to return to her PLOF  Plan: Continue per plan of care  Re-eval at next session

## 2019-02-27 ENCOUNTER — APPOINTMENT (OUTPATIENT)
Dept: PHYSICAL THERAPY | Facility: CLINIC | Age: 64
End: 2019-02-27
Payer: COMMERCIAL

## 2019-03-26 NOTE — PROGRESS NOTES
PT Discharge    Patient is D/C from PT 2* [place] and will continue to perform HEP

## 2019-05-14 ENCOUNTER — EVALUATION (OUTPATIENT)
Dept: PHYSICAL THERAPY | Facility: CLINIC | Age: 64
End: 2019-05-14
Payer: COMMERCIAL

## 2019-05-14 ENCOUNTER — TRANSCRIBE ORDERS (OUTPATIENT)
Dept: PHYSICAL THERAPY | Facility: CLINIC | Age: 64
End: 2019-05-14

## 2019-05-14 DIAGNOSIS — M54.12 CERVICAL RADICULOPATHY: Primary | ICD-10-CM

## 2019-05-14 DIAGNOSIS — M79.18 MYOFASCIAL PAIN: ICD-10-CM

## 2019-05-14 DIAGNOSIS — M50.20 PROTRUSION OF CERVICAL INTERVERTEBRAL DISC: ICD-10-CM

## 2019-05-14 DIAGNOSIS — M54.2 NECK PAIN: ICD-10-CM

## 2019-05-14 PROCEDURE — 97110 THERAPEUTIC EXERCISES: CPT

## 2019-05-14 PROCEDURE — 97162 PT EVAL MOD COMPLEX 30 MIN: CPT

## 2019-05-14 PROCEDURE — 97140 MANUAL THERAPY 1/> REGIONS: CPT

## 2019-05-20 ENCOUNTER — OFFICE VISIT (OUTPATIENT)
Dept: PHYSICAL THERAPY | Facility: CLINIC | Age: 64
End: 2019-05-20
Payer: COMMERCIAL

## 2019-05-20 DIAGNOSIS — M79.18 MYOFASCIAL PAIN: ICD-10-CM

## 2019-05-20 DIAGNOSIS — M54.2 NECK PAIN: ICD-10-CM

## 2019-05-20 DIAGNOSIS — M50.20 PROTRUSION OF CERVICAL INTERVERTEBRAL DISC: ICD-10-CM

## 2019-05-20 DIAGNOSIS — M54.12 CERVICAL RADICULOPATHY: Primary | ICD-10-CM

## 2019-05-20 PROCEDURE — 97110 THERAPEUTIC EXERCISES: CPT

## 2019-05-20 PROCEDURE — 97140 MANUAL THERAPY 1/> REGIONS: CPT

## 2019-05-22 ENCOUNTER — OFFICE VISIT (OUTPATIENT)
Dept: PHYSICAL THERAPY | Facility: CLINIC | Age: 64
End: 2019-05-22
Payer: COMMERCIAL

## 2019-05-22 DIAGNOSIS — M54.12 CERVICAL RADICULOPATHY: Primary | ICD-10-CM

## 2019-05-22 DIAGNOSIS — M79.18 MYOFASCIAL PAIN: ICD-10-CM

## 2019-05-22 DIAGNOSIS — M54.2 NECK PAIN: ICD-10-CM

## 2019-05-22 DIAGNOSIS — M50.20 PROTRUSION OF CERVICAL INTERVERTEBRAL DISC: ICD-10-CM

## 2019-05-22 PROCEDURE — 97140 MANUAL THERAPY 1/> REGIONS: CPT | Performed by: PHYSICAL THERAPIST

## 2019-05-22 PROCEDURE — 97110 THERAPEUTIC EXERCISES: CPT | Performed by: PHYSICAL THERAPIST

## 2019-05-28 ENCOUNTER — OFFICE VISIT (OUTPATIENT)
Dept: PHYSICAL THERAPY | Facility: CLINIC | Age: 64
End: 2019-05-28
Payer: COMMERCIAL

## 2019-05-28 DIAGNOSIS — M79.18 MYOFASCIAL PAIN: ICD-10-CM

## 2019-05-28 DIAGNOSIS — M50.20 PROTRUSION OF CERVICAL INTERVERTEBRAL DISC: ICD-10-CM

## 2019-05-28 DIAGNOSIS — M54.2 NECK PAIN: ICD-10-CM

## 2019-05-28 DIAGNOSIS — M54.12 CERVICAL RADICULOPATHY: Primary | ICD-10-CM

## 2019-05-28 PROCEDURE — 97140 MANUAL THERAPY 1/> REGIONS: CPT

## 2019-05-28 PROCEDURE — 97110 THERAPEUTIC EXERCISES: CPT

## 2019-06-03 ENCOUNTER — OFFICE VISIT (OUTPATIENT)
Dept: PHYSICAL THERAPY | Facility: CLINIC | Age: 64
End: 2019-06-03
Payer: COMMERCIAL

## 2019-06-03 DIAGNOSIS — M54.2 NECK PAIN: ICD-10-CM

## 2019-06-03 DIAGNOSIS — M79.18 MYOFASCIAL PAIN: ICD-10-CM

## 2019-06-03 DIAGNOSIS — M50.20 PROTRUSION OF CERVICAL INTERVERTEBRAL DISC: ICD-10-CM

## 2019-06-03 DIAGNOSIS — M54.12 CERVICAL RADICULOPATHY: Primary | ICD-10-CM

## 2019-06-03 PROCEDURE — 97140 MANUAL THERAPY 1/> REGIONS: CPT

## 2019-06-03 PROCEDURE — 97112 NEUROMUSCULAR REEDUCATION: CPT

## 2019-06-05 ENCOUNTER — OFFICE VISIT (OUTPATIENT)
Dept: PHYSICAL THERAPY | Facility: CLINIC | Age: 64
End: 2019-06-05
Payer: COMMERCIAL

## 2019-06-05 DIAGNOSIS — M79.18 MYOFASCIAL PAIN: ICD-10-CM

## 2019-06-05 DIAGNOSIS — M50.20 PROTRUSION OF CERVICAL INTERVERTEBRAL DISC: ICD-10-CM

## 2019-06-05 DIAGNOSIS — M54.12 CERVICAL RADICULOPATHY: Primary | ICD-10-CM

## 2019-06-05 DIAGNOSIS — M54.2 NECK PAIN: ICD-10-CM

## 2019-06-05 PROCEDURE — 97140 MANUAL THERAPY 1/> REGIONS: CPT | Performed by: PHYSICAL THERAPIST

## 2019-06-05 PROCEDURE — 97110 THERAPEUTIC EXERCISES: CPT | Performed by: PHYSICAL THERAPIST

## 2019-06-10 ENCOUNTER — OFFICE VISIT (OUTPATIENT)
Dept: PHYSICAL THERAPY | Facility: CLINIC | Age: 64
End: 2019-06-10
Payer: COMMERCIAL

## 2019-06-10 DIAGNOSIS — M54.12 CERVICAL RADICULOPATHY: Primary | ICD-10-CM

## 2019-06-10 DIAGNOSIS — M50.20 PROTRUSION OF CERVICAL INTERVERTEBRAL DISC: ICD-10-CM

## 2019-06-10 DIAGNOSIS — M54.2 NECK PAIN: ICD-10-CM

## 2019-06-10 DIAGNOSIS — M79.18 MYOFASCIAL PAIN: ICD-10-CM

## 2019-06-10 PROCEDURE — 97110 THERAPEUTIC EXERCISES: CPT

## 2019-06-10 PROCEDURE — 97112 NEUROMUSCULAR REEDUCATION: CPT

## 2019-06-10 PROCEDURE — 97140 MANUAL THERAPY 1/> REGIONS: CPT

## 2019-06-11 ENCOUNTER — OFFICE VISIT (OUTPATIENT)
Dept: PHYSICAL THERAPY | Facility: CLINIC | Age: 64
End: 2019-06-11
Payer: COMMERCIAL

## 2019-06-11 DIAGNOSIS — M54.2 NECK PAIN: ICD-10-CM

## 2019-06-11 DIAGNOSIS — M54.12 CERVICAL RADICULOPATHY: Primary | ICD-10-CM

## 2019-06-11 DIAGNOSIS — M50.20 PROTRUSION OF CERVICAL INTERVERTEBRAL DISC: ICD-10-CM

## 2019-06-11 DIAGNOSIS — M79.18 MYOFASCIAL PAIN: ICD-10-CM

## 2019-06-11 PROCEDURE — 97110 THERAPEUTIC EXERCISES: CPT

## 2019-06-11 PROCEDURE — 97140 MANUAL THERAPY 1/> REGIONS: CPT

## 2019-06-11 PROCEDURE — 97112 NEUROMUSCULAR REEDUCATION: CPT

## 2019-06-17 ENCOUNTER — OFFICE VISIT (OUTPATIENT)
Dept: PHYSICAL THERAPY | Facility: CLINIC | Age: 64
End: 2019-06-17
Payer: COMMERCIAL

## 2019-06-17 DIAGNOSIS — M50.20 PROTRUSION OF CERVICAL INTERVERTEBRAL DISC: ICD-10-CM

## 2019-06-17 DIAGNOSIS — M54.2 NECK PAIN: ICD-10-CM

## 2019-06-17 DIAGNOSIS — M54.12 CERVICAL RADICULOPATHY: Primary | ICD-10-CM

## 2019-06-17 DIAGNOSIS — M79.18 MYOFASCIAL PAIN: ICD-10-CM

## 2019-06-17 PROCEDURE — 97110 THERAPEUTIC EXERCISES: CPT

## 2019-06-17 PROCEDURE — 97140 MANUAL THERAPY 1/> REGIONS: CPT

## 2019-06-19 ENCOUNTER — OFFICE VISIT (OUTPATIENT)
Dept: PHYSICAL THERAPY | Facility: CLINIC | Age: 64
End: 2019-06-19
Payer: COMMERCIAL

## 2019-06-19 DIAGNOSIS — M54.12 CERVICAL RADICULOPATHY: Primary | ICD-10-CM

## 2019-06-19 DIAGNOSIS — M54.2 NECK PAIN: ICD-10-CM

## 2019-06-19 DIAGNOSIS — M79.18 MYOFASCIAL PAIN: ICD-10-CM

## 2019-06-19 DIAGNOSIS — M50.20 PROTRUSION OF CERVICAL INTERVERTEBRAL DISC: ICD-10-CM

## 2019-06-19 PROCEDURE — 97140 MANUAL THERAPY 1/> REGIONS: CPT

## 2019-06-19 PROCEDURE — 97112 NEUROMUSCULAR REEDUCATION: CPT

## 2019-06-19 PROCEDURE — 97110 THERAPEUTIC EXERCISES: CPT

## 2019-06-24 ENCOUNTER — EVALUATION (OUTPATIENT)
Dept: PHYSICAL THERAPY | Facility: CLINIC | Age: 64
End: 2019-06-24
Payer: COMMERCIAL

## 2019-06-24 ENCOUNTER — TRANSCRIBE ORDERS (OUTPATIENT)
Dept: PHYSICAL THERAPY | Facility: CLINIC | Age: 64
End: 2019-06-24

## 2019-06-24 DIAGNOSIS — M54.12 CERVICAL RADICULOPATHY: Primary | ICD-10-CM

## 2019-06-24 DIAGNOSIS — M54.12 CERVICAL RADICULITIS: ICD-10-CM

## 2019-06-24 DIAGNOSIS — M54.2 NECK PAIN: ICD-10-CM

## 2019-06-24 DIAGNOSIS — M50.20 PROTRUSION OF CERVICAL INTERVERTEBRAL DISC: ICD-10-CM

## 2019-06-24 DIAGNOSIS — M79.18 MYOFASCIAL PAIN: ICD-10-CM

## 2019-06-24 PROCEDURE — 97110 THERAPEUTIC EXERCISES: CPT

## 2019-06-24 PROCEDURE — 97140 MANUAL THERAPY 1/> REGIONS: CPT

## 2019-06-25 ENCOUNTER — OFFICE VISIT (OUTPATIENT)
Dept: PHYSICAL THERAPY | Facility: CLINIC | Age: 64
End: 2019-06-25
Payer: COMMERCIAL

## 2019-06-25 DIAGNOSIS — M79.18 MYOFASCIAL PAIN: ICD-10-CM

## 2019-06-25 DIAGNOSIS — M50.20 PROTRUSION OF CERVICAL INTERVERTEBRAL DISC: ICD-10-CM

## 2019-06-25 DIAGNOSIS — M54.12 CERVICAL RADICULOPATHY: Primary | ICD-10-CM

## 2019-06-25 DIAGNOSIS — M54.2 NECK PAIN: ICD-10-CM

## 2019-06-25 PROCEDURE — 97110 THERAPEUTIC EXERCISES: CPT

## 2019-06-25 PROCEDURE — 97112 NEUROMUSCULAR REEDUCATION: CPT

## 2019-06-25 PROCEDURE — 97140 MANUAL THERAPY 1/> REGIONS: CPT

## 2019-09-03 ENCOUNTER — APPOINTMENT (OUTPATIENT)
Dept: PHYSICAL THERAPY | Facility: CLINIC | Age: 64
End: 2019-09-03
Payer: COMMERCIAL

## 2019-09-04 ENCOUNTER — EVALUATION (OUTPATIENT)
Dept: PHYSICAL THERAPY | Facility: CLINIC | Age: 64
End: 2019-09-04
Payer: COMMERCIAL

## 2019-09-04 DIAGNOSIS — Z98.890 S/P RIGHT ROTATOR CUFF REPAIR: Primary | ICD-10-CM

## 2019-09-04 PROCEDURE — 97140 MANUAL THERAPY 1/> REGIONS: CPT

## 2019-09-04 PROCEDURE — 97163 PT EVAL HIGH COMPLEX 45 MIN: CPT

## 2019-09-04 PROCEDURE — 97110 THERAPEUTIC EXERCISES: CPT

## 2019-09-04 NOTE — LETTER
2019    Acosta Steiner MD  [facility]    Patient: Kalin Oreilly   YOB: 1955   Date of Visit: 2019     Encounter Diagnosis     ICD-10-CM    1  S/P right rotator cuff repair A40 211        Dear Dr Noonan Rom: Thank you for your recent referral of Kalin Oreilly  Please review the attached evaluation summary from Mary Beth's recent visit  Please verify that you agree with the plan of care by signing the attached order  If you have any questions or concerns, please do not hesitate to call  I sincerely appreciate the opportunity to share in the care of one of your patients and hope to have another opportunity to work with you in the near future  Sincerely,    Vanessa Raza, PT      Referring Provider:      I certify that I have read the below Plan of Care and certify the need for these services furnished under this plan of treatment while under my care  Acosta Steiner MD  [address] [facility] [id number]  [address]  VIA Facsimile: [phone]          PT Evaluation     Today's date: 2019  Patient name: Kalin Oreilly  :   MRN: [id number]  Referring provider: Vignesh Morel MD  Dx:   Encounter Diagnosis     ICD-10-CM    1  S/P right rotator cuff repair Z98 890        Start Time: 1000  Stop Time: 1100  Total time in clinic (min): 60 minutes    Assessment  Assessment details: Patient is a 57 yo female presenting to physical therapy s/p R rotator cuff repair, biceps tenotomy and acromioplasty on 19 2* increased pain with functional activities  Patient reported no complications from surgery  Patient presents with decreased AROM, PROM, strength, muscle endurance and poor posture  Patient has severe difficulty with ADLs 2* restrictions from protocol  PT educated the patient on posture as well as restrictions   Patient was able to gonzalo and doff the brace (I) as well as know the signs of infection  PT will address the noted impairments by performing shoulder and cervical strengthening, stretching, postural training, functional activities and manual techniques when appropriate in the protocol to allow the patient to return to her PLOF  PT recommended 2x/week for 6-8 weeks c a good prognosis 2* PLOF  Impairments: abnormal or restricted ROM, activity intolerance, impaired physical strength, lacks appropriate home exercise program, pain with function, safety issue, weight-bearing intolerance, poor posture  and poor body mechanics    Symptom irritability: highUnderstanding of Dx/Px/POC: good   Prognosis: good    Goals  STG: In three weeks the patient will:    1  Be (I) with her HEP  2  Increase PROM of the R shoulderto 90 degrees of flexion  3  Increase PROM of the R shoulderto 90 degrees of abduction  4  Increase PROM of the R shoulder to 45 degrees of ER and 20 degrees of IR  LTG: In six weeks, the patient will:    1  Increase FOTO score to 56 to demonstrate improvements in symptoms and function  2  Demonstrate R shoudler PROM to the followin degrees of flexion and abduction, ER to 60-90 degrees and IR to 30 degrees   3  D/C sling  In 6-8 week the patient will:   1  Increase R shoulder full PROM to the followin degrees of flexion and abduction; 90 degrees of ER and 30 degrees of IR           Plan  Patient would benefit from: skilled physical therapy  Referral necessary: No  Planned modality interventions: cryotherapy and thermotherapy: hydrocollator packs  Planned therapy interventions: abdominal trunk stabilization, joint mobilization, manual therapy, massage, Silveira taping, neuromuscular re-education, patient education, postural training, strengthening, stretching, therapeutic activities, therapeutic exercise, home exercise program, functional ROM exercises and body mechanics training  Frequency: 2x week  Duration in visits: 16  Duration in weeks: 8  Plan of Care beginning date: 2019  Plan of Care expiration date: 10/30/2019  Treatment plan discussed with: patient        Subjective Evaluation    History of Present Illness  Mechanism of injury: Patient presents to physical therapy s/p R rotator cuff repair, biceps tenotomy and acromioplasty on 19  Patient participated in PT prior to surgery with some gains, however they were more focused on cervical pain  Patient noted she received a MRI with the doctor noting 75% tear on the R rotator cuff  Patient had surgery and noted no complications during or post  Patient noted she is off the medications, however is having a hard time sleeping  Patient noted dressing and ADLs are very difficulty 2* restrictions  Patient noted cramping feeling in the anterior aspect of the R shoulder at times            Recurrent probem    Quality of life: good    Pain  Current pain ratin  At best pain ratin (resting)  At worst pain ratin (momentary pain when getting dressed)  Location: R anterior shoulder  Quality: throbbing and cramping  Relieving factors: ice and rest  Aggravating factors: lifting and overhead activity  Progression: improved    Social Support  Steps to enter house: yes (3 BRADLY)  Lives in: [facility]  Lives with: alone    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)  Hand dominance: right      Diagnostic Tests  MRI studies: abnormal  Patient Goals  Patient goals for therapy: increased strength, independence with ADLs/IADLs, return to sport/leisure activities, return to work, increased motion and decreased pain  Patient goal: "to get dressed, drive car and eat without pain "        Objective     Postural Observations  Seated posture: fair  Standing posture: fair  Correction of posture: makes symptoms better        Cervical/Thoracic Screen   Cervical range of motion within normal limits with the following exceptions: Patient has restriction in the following: cervical extension, sidebending and rotation  Neurological Testing     Sensation     Shoulder   Left Shoulder   Intact: light touch    Right Shoulder   Intact: light touch  Diminished: light touch    Comments   Right light touch: Diminished at C5    Active Range of Motion   Left Shoulder   Normal active range of motion    Additional Active Range of Motion Details  Not assessed on R UE 2* restrictions  Passive Range of Motion   Left Shoulder   Normal passive range of motion    Right Shoulder   Flexion: 60 degrees with pain  Abduction: 50 degrees with pain  External rotation 0°:  0 degrees with pain  Internal rotation 0°:  10 degrees with pain    Strength/Myotome Testing     Left Shoulder     Planes of Motion   Flexion: 4+   Abduction: 4+   External rotation at 0°:  4   Internal rotation at 0°:  4+     Isolated Muscles   Biceps: 4+   Triceps: 4+     Additional Strength Details  PT did not assess R UE strength 2* restrictions         Flowsheet Rows      Most Recent Value   PT/OT G-Codes   Current Score  4   Projected Score  56   FOTO information reviewed  Yes   Assessment Type  Evaluation   G code set  Carrying, Moving & Handling Objects             Precautions: s/p R RTC repair on 8/28/19  See protocol attached to chart      Manual  9/4            R shoulder PROM per precautions MW                                                                    Exercise Diary  9/4            Pendulums (lateral, fwd/back, CW, CCW) x20 ea            Elbow flexion 2x10            Upper trap stretch nv            Levator scap stretch nv            Chin tuck  nv            Scapular retraction 2x10  5" hold            Wrist extension nv            Wrist flexion nv            Wrist radial deviation nv                                                                                                                                                               Modalities  9/4            CP PRN np

## 2019-09-04 NOTE — PROGRESS NOTES
PT Evaluation     Today's date: 2019  Patient name: Henry Chang  :   MRN: [id number]  Referring provider: Angelita Conteh MD  Dx:   Encounter Diagnosis     ICD-10-CM    1  S/P right rotator cuff repair Z98 890        Start Time: 1000  Stop Time: 1100  Total time in clinic (min): 60 minutes    Assessment  Assessment details: Patient is a 57 yo female presenting to physical therapy s/p R rotator cuff repair, biceps tenotomy and acromioplasty on 19 2* increased pain with functional activities  Patient reported no complications from surgery  Patient presents with decreased AROM, PROM, strength, muscle endurance and poor posture  Patient has severe difficulty with ADLs 2* restrictions from protocol  PT educated the patient on posture as well as restrictions  Patient was able to gonzalo and doff the brace (I) as well as know the signs of infection  PT will address the noted impairments by performing shoulder and cervical strengthening, stretching, postural training, functional activities and manual techniques when appropriate in the protocol to allow the patient to return to her PLOF  PT recommended 2x/week for 6-8 weeks c a good prognosis 2* PLOF  Impairments: abnormal or restricted ROM, activity intolerance, impaired physical strength, lacks appropriate home exercise program, pain with function, safety issue, weight-bearing intolerance, poor posture  and poor body mechanics    Symptom irritability: highUnderstanding of Dx/Px/POC: good   Prognosis: good    Goals  STG: In three weeks the patient will:    1  Be (I) with her HEP  2  Increase PROM of the R shoulderto 90 degrees of flexion  3  Increase PROM of the R shoulderto 90 degrees of abduction  4  Increase PROM of the R shoulder to 45 degrees of ER and 20 degrees of IR  LTG: In six weeks, the patient will:    1  Increase FOTO score to 56 to demonstrate improvements in symptoms and function    2  Demonstrate R shoudler PROM to the followin degrees of flexion and abduction, ER to 60-90 degrees and IR to 30 degrees   3  D/C sling  In 6-8 week the patient will:   1  Increase R shoulder full PROM to the followin degrees of flexion and abduction; 90 degrees of ER and 30 degrees of IR  Plan  Patient would benefit from: skilled physical therapy  Referral necessary: No  Planned modality interventions: cryotherapy and thermotherapy: hydrocollator packs  Planned therapy interventions: abdominal trunk stabilization, joint mobilization, manual therapy, massage, Silveira taping, neuromuscular re-education, patient education, postural training, strengthening, stretching, therapeutic activities, therapeutic exercise, home exercise program, functional ROM exercises and body mechanics training  Frequency: 2x week  Duration in visits: 16  Duration in weeks: 8  Plan of Care beginning date: 2019  Plan of Care expiration date: 10/30/2019  Treatment plan discussed with: patient        Subjective Evaluation    History of Present Illness  Mechanism of injury: Patient presents to physical therapy s/p R rotator cuff repair, biceps tenotomy and acromioplasty on 19  Patient participated in PT prior to surgery with some gains, however they were more focused on cervical pain  Patient noted she received a MRI with the doctor noting 75% tear on the R rotator cuff  Patient had surgery and noted no complications during or post  Patient noted she is off the medications, however is having a hard time sleeping  Patient noted dressing and ADLs are very difficulty 2* restrictions  Patient noted cramping feeling in the anterior aspect of the R shoulder at times            Recurrent probem    Quality of life: good    Pain  Current pain ratin  At best pain ratin (resting)  At worst pain ratin (momentary pain when getting dressed)  Location: R anterior shoulder  Quality: throbbing and cramping  Relieving factors: ice and rest  Aggravating factors: lifting and overhead activity  Progression: improved    Social Support  Steps to enter house: yes (3 BRADLY)  Lives in: [facility]  Lives with: alone    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)  Hand dominance: right      Diagnostic Tests  MRI studies: abnormal  Patient Goals  Patient goals for therapy: increased strength, independence with ADLs/IADLs, return to sport/leisure activities, return to work, increased motion and decreased pain  Patient goal: "to get dressed, drive car and eat without pain "        Objective     Postural Observations  Seated posture: fair  Standing posture: fair  Correction of posture: makes symptoms better        Cervical/Thoracic Screen   Cervical range of motion within normal limits with the following exceptions: Patient has restriction in the following: cervical extension, sidebending and rotation  Neurological Testing     Sensation     Shoulder   Left Shoulder   Intact: light touch    Right Shoulder   Intact: light touch  Diminished: light touch    Comments   Right light touch: Diminished at C5    Active Range of Motion   Left Shoulder   Normal active range of motion    Additional Active Range of Motion Details  Not assessed on R UE 2* restrictions  Passive Range of Motion   Left Shoulder   Normal passive range of motion    Right Shoulder   Flexion: 60 degrees with pain  Abduction: 50 degrees with pain  External rotation 0°: 0 degrees with pain  Internal rotation 0°: 10 degrees with pain    Strength/Myotome Testing     Left Shoulder     Planes of Motion   Flexion: 4+   Abduction: 4+   External rotation at 0°: 4   Internal rotation at 0°: 4+     Isolated Muscles   Biceps: 4+   Triceps: 4+     Additional Strength Details  PT did not assess R UE strength 2* restrictions         Flowsheet Rows      Most Recent Value   PT/OT G-Codes   Current Score  4   Projected Score  56   FOTO information reviewed  Yes   Assessment Type  Evaluation   G code set Carrying, Moving & Handling Objects             Precautions: s/p R RTC repair on 8/28/19  See protocol attached to chart      Manual  9/4            R shoulder PROM per precautions MW                                                                    Exercise Diary  9/4            Pendulums (lateral, fwd/back, CW, CCW) x20 ea            Elbow flexion 2x10            Upper trap stretch nv            Levator scap stretch nv            Chin tuck  nv            Scapular retraction 2x10  5" hold            Wrist extension nv            Wrist flexion nv            Wrist radial deviation nv                                                                                                                                                               Modalities  9/4            CP PRN np

## 2019-09-05 ENCOUNTER — TRANSCRIBE ORDERS (OUTPATIENT)
Dept: PHYSICAL THERAPY | Facility: CLINIC | Age: 64
End: 2019-09-05

## 2019-09-05 DIAGNOSIS — Z98.890 S/P RIGHT ROTATOR CUFF REPAIR: Primary | ICD-10-CM

## 2019-09-09 ENCOUNTER — OFFICE VISIT (OUTPATIENT)
Dept: PHYSICAL THERAPY | Facility: CLINIC | Age: 64
End: 2019-09-09
Payer: COMMERCIAL

## 2019-09-09 DIAGNOSIS — Z98.890 S/P RIGHT ROTATOR CUFF REPAIR: Primary | ICD-10-CM

## 2019-09-09 PROCEDURE — 97140 MANUAL THERAPY 1/> REGIONS: CPT

## 2019-09-09 PROCEDURE — 97110 THERAPEUTIC EXERCISES: CPT

## 2019-09-09 NOTE — PROGRESS NOTES
Daily Note     Today's date: 2019  Patient name: Shu Membreno  :   MRN: [id number]  Referring provider: Ministerio Locke MD  Dx:   Encounter Diagnosis     ICD-10-CM    1  S/P right rotator cuff repair Z98 890        Start Time: 1430  Stop Time: 5998  Total time in clinic (min): 60 minutes    Subjective: Patient noted that the weekend went okay  She noted she had about a 3/10 pain in the R shoulder the past few days  Patient noted she is sleeping better  Objective: See treatment diary below      Assessment: Patient improved with PROM during the session and noted less pain  Patient performed HEP and additional exercises with min VCs  Patient abided by all precautions during the session  Patient would benefit from continued PT to allow the patient to return to her PLOF  Plan: Continue per plan of care        Precautions: s/p R RTC repair on 19  See protocol attached to chart  Week 1-3  Flexion:  degrees  Abduction: 90 degrees  ER to 45 degrees  IR to 20 degrees      Manual             R shoulder PROM per precautions MW MW                                                                   Exercise Diary             Pendulums (lateral, fwd/back, CW, CCW) x20 ea x30 ea           Elbow flexion 2x10 2x10           Upper trap stretch nv 3x30" ea           Levator scap stretch nv            Chin tuck  nv 2x10  5" hold           Scapular retraction 2x10  5" hold 2x10  5" hold           Wrist extension nv 2x10           Wrist flexion nv 2x10           Wrist radial deviation nv 2x10                                                                                                                                                              Modalities             CP PRN np 10'  post

## 2019-09-11 ENCOUNTER — OFFICE VISIT (OUTPATIENT)
Dept: PHYSICAL THERAPY | Facility: CLINIC | Age: 64
End: 2019-09-11
Payer: COMMERCIAL

## 2019-09-11 DIAGNOSIS — Z98.890 S/P RIGHT ROTATOR CUFF REPAIR: Primary | ICD-10-CM

## 2019-09-11 PROCEDURE — 97112 NEUROMUSCULAR REEDUCATION: CPT

## 2019-09-11 PROCEDURE — 97140 MANUAL THERAPY 1/> REGIONS: CPT

## 2019-09-11 PROCEDURE — 97110 THERAPEUTIC EXERCISES: CPT

## 2019-09-11 NOTE — PROGRESS NOTES
Daily Note     Today's date: 2019  Patient name: Bria Thrasher  :   MRN: [id number]  Referring provider: Saurabh Alexis MD  Dx:   Encounter Diagnosis     ICD-10-CM    1  S/P right rotator cuff repair Z98 890        Start Time: 1400  Stop Time: 1500  Total time in clinic (min): 60 minutes    Subjective: Patient noted that she was a little sore after last session, however felt okay after ice  Patient noted that she did not sleep well last night 2* could not get comfortable  Objective: See treatment diary below      Assessment: Patient improved with PROM as well as posture throughout the session  Patient noted some discomfort during levator scap stretch, however after rest the pain decreased  PT applied ice at the end of the session for pain control  Patient would benefit from continued PT to allow the patient to return to her PLOF  Plan: Continue per plan of care        Precautions: s/p R RTC repair on 19  See protocol attached to chart  Week 1-3  Flexion:  degrees  Abduction: 90 degrees  ER to 45 degrees  IR to 20 degrees      Manual            R shoulder PROM per precautions MW MW MW                                                                  Exercise Diary            Pendulums (lateral, fwd/back, CW, CCW) x20 ea x30 ea x30 ea          Elbow flexion 2x10 2x10 2x10          Upper trap stretch nv 3x30" ea 3x30" ea          Levator scap stretch nv  2x30" ea          Chin tuck  nv 2x10  5" hold 2x10  5" hold          Scapular retraction 2x10  5" hold 2x10  5" hold 3x10  5" hold          Wrist extension nv 2x10 2x10          Wrist flexion nv 2x10 2x10          Wrist radial deviation nv 2x10 2x10                                                                                                                                                             Modalities            CP PRN np 10'  post 10' post

## 2019-09-16 ENCOUNTER — OFFICE VISIT (OUTPATIENT)
Dept: PHYSICAL THERAPY | Facility: CLINIC | Age: 64
End: 2019-09-16
Payer: COMMERCIAL

## 2019-09-16 DIAGNOSIS — Z98.890 S/P RIGHT ROTATOR CUFF REPAIR: Primary | ICD-10-CM

## 2019-09-16 PROCEDURE — 97140 MANUAL THERAPY 1/> REGIONS: CPT

## 2019-09-16 PROCEDURE — 97110 THERAPEUTIC EXERCISES: CPT

## 2019-09-16 NOTE — PROGRESS NOTES
Daily Note     Today's date: 2019  Patient name: Kezia Rankin  :   MRN: [id number]  Referring provider: Sheryle Baars, MD  Dx:   Encounter Diagnosis     ICD-10-CM    1  S/P right rotator cuff repair Z98 890        Start Time:   Stop Time:   Total time in clinic (min): 45 minutes    Subjective: Patient noted that she had her follow up with the surgeon and he was happy with the progress  Patient noted that the pain is continuing to decrease as the days go on  Objective: See treatment diary below      Assessment: Patient improved with posture throughout the session and required less cues  Patient continues to improve with PROM, however does have some pain in abduction and descending from flexion  Patient would benefit from continued PT to allow the patient to return to her PLOF  Plan: Continue per plan of care        Precautions: s/p R RTC repair on 19  See protocol attached to chart  Week 1-3  Flexion:  degrees  Abduction: 90 degrees  ER to 45 degrees  IR to 20 degrees      Manual           R shoulder PROM per precautions MW MW MW MW                                                                 Exercise Diary           Pendulums (lateral, fwd/back, CW, CCW) x20 ea x30 ea x30 ea x30 ea         Elbow flexion 2x10 2x10 2x10 x10 unassisted  x20 assisted by L         Upper trap stretch nv 3x30" ea 3x30" ea 3x30" ea         Levator scap stretch nv  2x30" ea          Chin tuck  nv 2x10  5" hold 2x10  5" hold 3x10  5" hold         Scapular retraction 2x10  5" hold 2x10  5" hold 3x10  5" hold 3x10  5" hold         Wrist extension nv 2x10 2x10 2x10         Wrist flexion nv 2x10 2x10 2x10         Wrist radial deviation nv 2x10 2x10 2x10                                                                                                                                                            Modalities            CP PRN np 10'  post 10' post

## 2019-09-19 ENCOUNTER — OFFICE VISIT (OUTPATIENT)
Dept: PHYSICAL THERAPY | Facility: CLINIC | Age: 64
End: 2019-09-19
Payer: COMMERCIAL

## 2019-09-19 DIAGNOSIS — Z98.890 S/P RIGHT ROTATOR CUFF REPAIR: Primary | ICD-10-CM

## 2019-09-19 PROCEDURE — 97140 MANUAL THERAPY 1/> REGIONS: CPT

## 2019-09-19 PROCEDURE — 97112 NEUROMUSCULAR REEDUCATION: CPT

## 2019-09-19 PROCEDURE — 97110 THERAPEUTIC EXERCISES: CPT

## 2019-09-19 NOTE — PROGRESS NOTES
Daily Note     Today's date: 2019  Patient name: Henry Chang  :   MRN: [id number]  Referring provider: Angelita Conteh MD  Dx:   Encounter Diagnosis     ICD-10-CM    1  S/P right rotator cuff repair Z98 890        Start Time: 1430  Stop Time: 3703  Total time in clinic (min): 65 minutes    Subjective: Patient noted that she did some laundry yesterday and aftwards noted a big pop in the R levator scap that had some pain with it  Patient noted she was in the brace at the time  Objective: See treatment diary below      Assessment:PT introduced the pulleys and AAROM exercises according to protocol  Patient performed the new exercises without pain  PT educated the patient to ice when she is home and continue with the pendulums to assist c soreness over the weekend  Patient would benefit from continued PT to allow the patient to return to her PLOF  Plan: Continue per plan of care        Precautions: s/p R RTC repair on 19  See protocol attached to chart  Week 4-6  Flexion: 120 degrees  Abduction: 120 degrees  ER: 60-90 degrees  IR to 30 degrees      Manual          R shoulder PROM per precautions MW MW MW MW MW                                                                Exercise Diary          Pendulums (lateral, fwd/back, CW, CCW) x20 ea x30 ea x30 ea x30 ea x30 ea        Elbow flexion 2x10 2x10 2x10 x10 unassisted  x20 assisted by L x20 unassisted        Upper trap stretch nv 3x30" ea 3x30" ea 3x30" ea         Levator scap stretch nv  2x30" ea          Chin tuck  nv 2x10  5" hold 2x10  5" hold 3x10  5" hold         Scapular retraction 2x10  5" hold 2x10  5" hold 3x10  5" hold 3x10  5" hold 3x10  5" hold        Wrist extension nv 2x10 2x10 2x10 2x10        Wrist flexion nv 2x10 2x10 2x10 2x10        Wrist radial deviation nv 2x10 2x10 2x10 2x10        pullies     5'        AAROM flexion and ER     x15 ea Modalities  9/4 9/9 9/11 9/19         CP PRN np 10'  post 10' post 8' post

## 2019-09-23 ENCOUNTER — OFFICE VISIT (OUTPATIENT)
Dept: PHYSICAL THERAPY | Facility: CLINIC | Age: 64
End: 2019-09-23
Payer: COMMERCIAL

## 2019-09-23 DIAGNOSIS — Z98.890 S/P RIGHT ROTATOR CUFF REPAIR: Primary | ICD-10-CM

## 2019-09-23 PROCEDURE — 97112 NEUROMUSCULAR REEDUCATION: CPT

## 2019-09-23 PROCEDURE — 97140 MANUAL THERAPY 1/> REGIONS: CPT

## 2019-09-23 PROCEDURE — 97110 THERAPEUTIC EXERCISES: CPT

## 2019-09-23 NOTE — PROGRESS NOTES
Daily Note     Today's date: 2019  Patient name: Aliya Corral  :   MRN: [id number]  Referring provider: Martinez Roque MD  Dx:   Encounter Diagnosis     ICD-10-CM    1  S/P right rotator cuff repair Z98 890        Start Time: 1500  Stop Time: 51  Total time in clinic (min): 63 minutes    Subjective: Patient noted no pain one day post therapy, however the following day she was sore  Patient notes that she continues to get cramping in the R shoulder at night for about half the night  Objective: See treatment diary below      Assessment: Patient presented with increased soreness throughout the session  Patient improved in supination and ER ROM during the session and was able to perform 25 biceps curls unassisted  Patient noted no increase in pain at the session  Patient would benefit from continued PTto allow the patient to return to her PLOF  Plan: Continue per plan of care        Precautions: s/p R RTC repair on 19  See protocol attached to chart  Week 4-6  Flexion: 120 degrees  Abduction: 120 degrees  ER: 60-90 degrees  IR to 30 degrees      Manual         R shoulder PROM per precautions MW MW MW MW MW MW                                                               Exercise Diary         Pendulums (lateral, fwd/back, CW, CCW) x20 ea x30 ea x30 ea x30 ea x30 ea x30 ea       Elbow flexion 2x10 2x10 2x10 x10 unassisted  x20 assisted by L x20 unassisted x25 unassisted       Upper trap stretch nv 3x30" ea 3x30" ea 3x30" ea         Levator scap stretch nv  2x30" ea          Chin tuck  nv 2x10  5" hold 2x10  5" hold 3x10  5" hold         Scapular retraction 2x10  5" hold 2x10  5" hold 3x10  5" hold 3x10  5" hold 3x10  5" hold 3x10  5" hold       Wrist extension nv 2x10 2x10 2x10 2x10 3x10       Wrist flexion nv 2x10 2x10 2x10 2x10 3x10       Wrist radial deviation nv 2x10 2x10 2x10 2x10 3x10       pullies     5' 5'       AAROM flexion and ER     x15 ea x10 flexion  x15 ER                                                                                                                                Modalities  9/4 9/9 9/11 9/19 9/23        CP PRN np 10'  post 10' post 10' post 10' post

## 2019-09-25 ENCOUNTER — OFFICE VISIT (OUTPATIENT)
Dept: PHYSICAL THERAPY | Facility: CLINIC | Age: 64
End: 2019-09-25
Payer: COMMERCIAL

## 2019-09-25 DIAGNOSIS — Z98.890 S/P RIGHT ROTATOR CUFF REPAIR: Primary | ICD-10-CM

## 2019-09-25 PROCEDURE — 97140 MANUAL THERAPY 1/> REGIONS: CPT

## 2019-09-25 PROCEDURE — 97110 THERAPEUTIC EXERCISES: CPT

## 2019-09-25 PROCEDURE — 97112 NEUROMUSCULAR REEDUCATION: CPT

## 2019-09-25 NOTE — PROGRESS NOTES
Daily Note     Today's date: 2019  Patient name: Portia Gallardo  :   MRN: [id number]  Referring provider: Patricio Miller MD  Dx:   Encounter Diagnosis     ICD-10-CM    1  S/P right rotator cuff repair Z98 890        Start Time: 1400  Stop Time: 1503  Total time in clinic (min): 63 minutes    Subjective: Patient noted she felt good after last session and noted no soreness 2* using heat at home  Objective: See treatment diary below      Assessment: Patient progressed to 120 degrees of PROM of shoulder flexion at today's session  Patient continues to improve with shoulder abduction and ER, however there continues to be limitations due to pain  Patient is able to perform exercises at a faster pace due to feeling better and not having as much pain  Patient would benefit from continued PT to allow the patient to return to her PLOF  Plan: Continue per plan of care        Precautions: s/p R RTC repair on 19  See protocol attached to chart  Week 4-6  Flexion: 120 degrees  Abduction: 120 degrees  ER: 60-90 degrees  IR to 30 degrees      Manual        R shoulder PROM per precautions MW MW MW MW MW MW MW                                                              Exercise Diary        Pendulums (lateral, fwd/back, CW, CCW) x20 ea x30 ea x30 ea x30 ea x30 ea x30 ea x30 ea      Elbow flexion 2x10 2x10 2x10 x10 unassisted  x20 assisted by L x20 unassisted x25 unassisted x25 unassisted      Upper trap stretch nv 3x30" ea 3x30" ea 3x30" ea         Levator scap stretch nv  2x30" ea          Chin tuck  nv 2x10  5" hold 2x10  5" hold 3x10  5" hold         Scapular retraction 2x10  5" hold 2x10  5" hold 3x10  5" hold 3x10  5" hold 3x10  5" hold 3x10  5" hold 3x10  5" hold      Wrist extension nv 2x10 2x10 2x10 2x10 3x10 3x10      Wrist flexion nv 2x10 2x10 2x10 2x10 3x10 3x10      Wrist radial deviation nv 2x10 2x10 2x10 2x10 3x10 3x10 pullies     5' 5' 8'      AAROM flexion and ER     x15 ea x10 flexion  x15 ER x20 ea  improved      Serratus punches                                                                                                                         Modalities  9/4 9/9 9/11 9/19 9/23 9/25       CP PRN np 10'  post 10' post 10' post 10' post 10' post

## 2019-09-30 ENCOUNTER — OFFICE VISIT (OUTPATIENT)
Dept: PHYSICAL THERAPY | Facility: CLINIC | Age: 64
End: 2019-09-30
Payer: COMMERCIAL

## 2019-09-30 DIAGNOSIS — M50.20 PROTRUSION OF CERVICAL INTERVERTEBRAL DISC: ICD-10-CM

## 2019-09-30 DIAGNOSIS — M79.18 MYOFASCIAL PAIN: ICD-10-CM

## 2019-09-30 DIAGNOSIS — M54.2 NECK PAIN: ICD-10-CM

## 2019-09-30 DIAGNOSIS — Z98.890 S/P RIGHT ROTATOR CUFF REPAIR: Primary | ICD-10-CM

## 2019-09-30 DIAGNOSIS — M54.12 CERVICAL RADICULOPATHY: ICD-10-CM

## 2019-09-30 PROCEDURE — 97110 THERAPEUTIC EXERCISES: CPT | Performed by: PHYSICAL THERAPIST

## 2019-09-30 NOTE — PROGRESS NOTES
Daily Note     Today's date: 2019  Patient name: Cathy Lay  :   MRN: [id number]  Referring provider: Troy Hernandez MD  Dx:   Encounter Diagnosis     ICD-10-CM    1  S/P right rotator cuff repair Z98 890    2  Cervical radiculopathy M54 12    3  Myofascial pain M79 18    4  Neck pain M54 2    5  Protrusion of cervical intervertebral disc M50 20                   Subjective: Feeling good today, is trying rodney and other spices to help with inflammation, notes mild relief after this  Has been compliant w/ HEP  Objective: See treatment diary below      Assessment: Completed exercise with correct technique and without reports of pain  Limited most notably in ER PROM due to pain  Pt would benefit from continued PT services to reduce pain and increase level of function  Plan: Continue per plan of care        Precautions: s/p R RTC repair on 19  See protocol attached to chart  Week 4-6  Flexion: 120 degrees  Abduction: 120 degrees  ER: 60-90 degrees  IR to 30 degrees      Manual       R shoulder PROM per precautions MW MW MW MW MW MW MW MM                                                             Exercise Diary       Pendulums (lateral, fwd/back, CW, CCW) x20 ea x30 ea x30 ea x30 ea x30 ea x30 ea x30 ea x30 ea     Elbow flexion 2x10 2x10 2x10 x10 unassisted  x20 assisted by L x20 unassisted x25 unassisted x25 unassisted x25 unassisted      Upper trap stretch nv 3x30" ea 3x30" ea 3x30" ea         Levator scap stretch nv  2x30" ea          Chin tuck  nv 2x10  5" hold 2x10  5" hold 3x10  5" hold         Scapular retraction 2x10  5" hold 2x10  5" hold 3x10  5" hold 3x10  5" hold 3x10  5" hold 3x10  5" hold 3x10  5" hold 3x10x5"     Wrist extension nv 2x10 2x10 2x10 2x10 3x10 3x10      Wrist flexion nv 2x10 2x10 2x10 2x10 3x10 3x10      Wrist radial deviation nv 2x10 2x10 2x10 2x10 3x10 3x10      pullies     5' 5' 8' 6'     AAROM flexion and ER     x15 ea x10 flexion  x15 ER x20 ea  improved x20 ea     Serratus punches                                                                                                                         Modalities  9/4 9/9 9/11 9/19 9/23 9/25 9/30      CP PRN np 10'  post 10' post 10' post 10' post 10' post Deferred

## 2019-10-03 ENCOUNTER — EVALUATION (OUTPATIENT)
Dept: PHYSICAL THERAPY | Facility: CLINIC | Age: 64
End: 2019-10-03
Payer: COMMERCIAL

## 2019-10-03 DIAGNOSIS — Z98.890 S/P RIGHT ROTATOR CUFF REPAIR: Primary | ICD-10-CM

## 2019-10-03 PROCEDURE — 97140 MANUAL THERAPY 1/> REGIONS: CPT

## 2019-10-03 PROCEDURE — 97110 THERAPEUTIC EXERCISES: CPT

## 2019-10-03 NOTE — PROGRESS NOTES
PT Re-Evaluation     Today's date: 10/3/2019  Patient name: Fernando Pereira  : 1955  MRN: [id number]  Referring provider: Kelle Eric MD  Dx:   Encounter Diagnosis     ICD-10-CM    1  S/P right rotator cuff repair Z98 890        Start Time: 1400  Stop Time: 1500  Total time in clinic (min): 60 minutes    Assessment  Assessment details: Since starting physical therapy the patient has improved with PROM, AAROM, posture and scapular strength  Patient noted decreased pain levels as well as it is easier to get dressed and do minor ADLs while in the brace  Patient is abiding by protocol and precautions  Patient continues to be limited in strength, AROM, PROM and muscle endurance  PT will continue to address the noted impairments by performing shoulder and scapular strengthening, stretching, posture training, functional activities and manual techniques per protocol to allow the patient to return to her PLOF  PT recommended 2x/week for 4-6 weeks c a good prognosis 2* noted progress  Impairments: abnormal or restricted ROM, activity intolerance, impaired physical strength, lacks appropriate home exercise program, pain with function, safety issue and poor posture     Symptom irritability: lowUnderstanding of Dx/Px/POC: good   Prognosis: good    Goals  STG: In three weeks the patient will:    1  Be (I) with her HEP  (50% MET)  2  Increase PROM of the R shoulderto 90 degrees of flexion  (MET)  3  Increase PROM of the R shoulderto 90 degrees of abduction  (MET)  4  Increase PROM of the R shoulder to 45 degrees of ER and 20 degrees of IR  (MET)      LTG: In six weeks, the patient will:    1  Increase FOTO score to 56 to demonstrate improvements in symptoms and function  (IN progress)  2  Demonstrate R shoudler PROM to the followin degrees of flexion and abduction, ER to 60-90 degrees and IR to 30 degrees (90% MET)  3  D/C sling  In 6-8 week the patient will:   1   Increase R shoulder full PROM to the followin degrees of flexion and abduction; 90 degrees of ER and 30 degrees of IR  (50% MET)  2  Increase strength to 4+/5 in scapular strength to assist c ADLs  Plan  Patient would benefit from: skilled physical therapy  Referral necessary: No  Planned modality interventions: cryotherapy and thermotherapy: hydrocollator packs  Planned therapy interventions: abdominal trunk stabilization, joint mobilization, manual therapy, massage, Silveira taping, neuromuscular re-education, patient education, postural training, strengthening, stretching, therapeutic activities, therapeutic exercise, home exercise program, functional ROM exercises and body mechanics training  Frequency: 2x week  Duration in visits: 12  Duration in weeks: 6  Plan of Care beginning date: 10/3/2019  Plan of Care expiration date: 2019  Treatment plan discussed with: patient        Subjective Evaluation    History of Present Illness  Mechanism of injury: Patient noted that she is 40% back to her PLOF  Patient noted that her pain levels are low, is able to sleep and has improved ROM  Patient also noted that dressing is improving  Patient noted washing the dishes and washing her face continues to be difficult             Recurrent probem    Quality of life: good    Pain  Current pain ratin  At best pain ratin (resting)  At worst pain ratin (certain movement, brief)  Location: R anterior shoulder  Quality: throbbing, cramping and sharp  Relieving factors: ice and rest  Aggravating factors: lifting and overhead activity  Progression: improved    Social Support  Steps to enter house: yes (3 BRADLY)  L[facility] in: [facility]  Lives with: alone    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)  Hand dominance: right      Diagnostic Tests  MRI studies: abnormal  Patient Goals  Patient goals for therapy: increased strength, independence with ADLs/IADLs, return to sport/leisure activities, return to work, increased motion and decreased pain  Patient goal: "to get dressed, drive car and eat without pain " (30% MET)        Objective     Postural Observations  Seated posture: fair  Standing posture: fair  Correction of posture: makes symptoms better        Cervical/Thoracic Screen   Cervical range of motion within normal limits with the following exceptions: Patient has restriction in the following: cervical extension, sidebending and rotation  Neurological Testing     Sensation     Shoulder   Left Shoulder   Intact: light touch    Right Shoulder   Intact: light touch  Diminished: light touch    Comments   Right light touch: Diminished at C5    Active Range of Motion   Left Shoulder   Normal active range of motion    Right Shoulder   Flexion: 132 degrees   External rotation 0°: 50 degrees     Additional Active Range of Motion Details  All R UE ROM measurements were AAROM  Passive Range of Motion   Left Shoulder   Normal passive range of motion    Right Shoulder   Flexion: 132 degrees   Abduction: 90 degrees with pain  External rotation 0°: 50 degrees   External rotation 45°: 30 degrees   Internal rotation 0°: 30 degrees     Strength/Myotome Testing     Left Shoulder     Planes of Motion   Flexion: 4+   Abduction: 4+   External rotation at 0°: 4   Internal rotation at 0°: 4+     Isolated Muscles   Biceps: 4+   Triceps: 4+     Additional Strength Details  PT did not assess R UE strength 2* restrictions                Precautions: s/p R RTC repair on 8/28/19  See protocol attached to chart  Week 4-6  Flexion: 120 degrees  Abduction: 120 degrees  ER: 60-90 degrees  IR to 30 degrees      Manual  9/4 9/9 9/11 9/16 9/19 9/23 9/25 9/30 10/3    R shoulder PROM per precautions MW MW MW MW MW MW MW MM MW    Re-eval         MW                                               Exercise Diary  9/4 9/9 9/11 9/16 9/19 9/23 9/25 9/30 10/3    Pendulums (lateral, fwd/back, CW, CCW) x20 ea x30 ea x30 ea x30 ea x30 ea x30 ea x30 ea x30 ea HEP Elbow flexion 2x10 2x10 2x10 x10 unassisted  x20 assisted by L x20 unassisted x25 unassisted x25 unassisted x25 unassisted      Upper trap stretch nv 3x30" ea 3x30" ea 3x30" ea         Levator scap stretch nv  2x30" ea          Chin tuck  nv 2x10  5" hold 2x10  5" hold 3x10  5" hold         Scapular retraction 2x10  5" hold 2x10  5" hold 3x10  5" hold 3x10  5" hold 3x10  5" hold 3x10  5" hold 3x10  5" hold 3x10x5" 3x10  5" hold    Wrist extension nv 2x10 2x10 2x10 2x10 3x10 3x10      Wrist flexion nv 2x10 2x10 2x10 2x10 3x10 3x10      Wrist radial deviation nv 2x10 2x10 2x10 2x10 3x10 3x10      pullies     5' 5' 8' 6' 8'    AAROM flexion and ER     x15 ea x10 flexion  x15 ER x20 ea  improved x20 ea x30 ea    Serratus punches                                                                                                                         Modalities  9/4 9/9 9/11 9/19 9/23 9/25 9/30      CP PRN np 10'  post 10' post 10' post 10' post 10' post Deferred

## 2019-10-03 NOTE — LETTER
2019    Matilde Cuba MD  C/Cortes Yoo    Patient: Marie Kang   YOB: 1955   Date of Visit: 10/3/2019     Encounter Diagnosis     ICD-10-CM    1  S/P right rotator cuff repair V72 [id number]        Dear Dr Cristobal Paniagua: Thank you for your recent referral of Marie Kang  Please review the attached evaluation summary from Mary Beth's recent visit  Please verify that you agree with the plan of care by signing the attached order  If you have any questions or concerns, please do not hesitate to call  I sincerely appreciate the opportunity to share in the care of one of your patients and hope to have another opportunity to work with you in the near future  Sincerely,    Alen Klein, PT      Referring Provider:      I certify that I have read the below Plan of Care and certify the need for these services furnished under this plan of treatment while under my care  Matilde Cuba MD  [address] [facility]  [address]  [facility]: [phone]          PT Re-Evaluation     Today's date: 10/3/2019  Patient name: Marie Kang  :   MRN: [id number]  Referring provider: Abundio Shukla MD  Dx:   Encounter Diagnosis     ICD-10-CM    1  S/P right rotator cuff repair Z98 890        Start Time: 1400  Stop Time: 1500  Total time in clinic (min): 60 minutes    Assessment  Assessment details: Since starting physical therapy the patient has improved with PROM, AAROM, posture and scapular strength  Patient noted decreased pain levels as well as it is easier to get dressed and do minor ADLs while in the brace  Patient is abiding by protocol and precautions  Patient continues to be limited in strength, AROM, PROM and muscle endurance   PT will continue to address the noted impairments by performing shoulder and scapular strengthening, stretching, posture training, functional activities and manual techniques per protocol to allow the patient to return to her PLOF  PT recommended 2x/week for 4-6 weeks c a good prognosis 2* noted progress  Impairments: abnormal or restricted ROM, activity intolerance, impaired physical strength, lacks appropriate home exercise program, pain with function, safety issue and poor posture     Symptom irritability: lowUnderstanding of Dx/Px/POC: good   Prognosis: good    Goals  STG: In three weeks the patient will:    1  Be (I) with her HEP  (50% MET)  2  Increase PROM of the R shoulderto 90 degrees of flexion  (MET)  3  Increase PROM of the R shoulderto 90 degrees of abduction  (MET)  4  Increase PROM of the R shoulder to 45 degrees of ER and 20 degrees of IR  (MET)      LTG: In six weeks, the patient will:    1  Increase FOTO score to 56 to demonstrate improvements in symptoms and function  (IN progress)  2  Demonstrate R shoudler PROM to the followin degrees of flexion and abduction, ER to 60-90 degrees and IR to 30 degrees (90% MET)  3  D/C sling  In 6-8 week the patient will:   1  Increase R shoulder full PROM to the followin degrees of flexion and abduction; 90 degrees of ER and 30 degrees of IR  (50% MET)  2  Increase strength to 4+/5 in scapular strength to assist c ADLs           Plan  Patient would benefit from: skilled physical therapy  Referral necessary: No  Planned modality interventions: cryotherapy and thermotherapy: hydrocollator packs  Planned therapy interventions: abdominal trunk stabilization, joint mobilization, manual therapy, massage, Silveira taping, neuromuscular re-education, patient education, postural training, strengthening, stretching, therapeutic activities, therapeutic exercise, home exercise program, functional ROM exercises and body mechanics training  Frequency: 2x week  Duration in visits: 12  Duration in weeks: 6  Plan of Care beginning date: 10/3/2019  Plan of Care expiration date: 2019  Treatment plan discussed with: patient        Subjective Evaluation    History of Present Illness  Mechanism of injury: Patient noted that she is 40% back to her PLOF  Patient noted that her pain levels are low, is able to sleep and has improved ROM  Patient also noted that dressing is improving  Patient noted washing the dishes and washing her face continues to be difficult  Recurrent probem    Quality of life: good    Pain  Current pain ratin  At best pain ratin (resting)  At worst pain ratin (certain movement, brief)  Location: R anterior shoulder  Quality: throbbing, cramping and sharp  Relieving factors: ice and rest  Aggravating factors: lifting and overhead activity  Progression: improved    Social Support  Steps to enter house: yes (3 BRADLY)  Lives in: [facility]  Lives with: alone    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)  Hand dominance: right      Diagnostic Tests  MRI studies: abnormal  Patient Goals  Patient goals for therapy: increased strength, independence with ADLs/IADLs, return to sport/leisure activities, return to work, increased motion and decreased pain  Patient goal: "to get dressed, drive car and eat without pain " (30% MET)        Objective     Postural Observations  Seated posture: fair  Standing posture: fair  Correction of posture: makes symptoms better        Cervical/Thoracic Screen   Cervical range of motion within normal limits with the following exceptions: Patient has restriction in the following: cervical extension, sidebending and rotation       Neurological Testing     Sensation     Shoulder   Left Shoulder   Intact: light touch    Right Shoulder   Intact: light touch  Diminished: light touch    Comments   Right light touch: Diminished at C5    Active Range of Motion   Left Shoulder   Normal active range of motion    Right Shoulder   Flexion: 132 degrees   External rotation 0°:  50 degrees     Additional Active Range of Motion Details  All R UE ROM measurements were AAROM  Passive Range of Motion   Left Shoulder   Normal passive range of motion    Right Shoulder   Flexion: 132 degrees   Abduction: 90 degrees with pain  External rotation 0°:  50 degrees   External rotation 45°:  30 degrees   Internal rotation 0°:  30 degrees     Strength/Myotome Testing     Left Shoulder     Planes of Motion   Flexion: 4+   Abduction: 4+   External rotation at 0°:  4   Internal rotation at 0°:  4+     Isolated Muscles   Biceps: 4+   Triceps: 4+     Additional Strength Details  PT did not assess R UE strength 2* restrictions                Precautions: s/p R RTC repair on 8/28/19  See protocol attached to chart  Week 4-6  Flexion: 120 degrees  Abduction: 120 degrees  ER: 60-90 degrees  IR to 30 degrees      Manual  9/4 9/9 9/11 9/16 9/19 9/23 9/25 9/30 10/3    R shoulder PROM per precautions MW MW MW MW MW MW MW MM MW    Re-eval         MW                                               Exercise Diary  9/4 9/9 9/11 9/16 9/19 9/23 9/25 9/30 10/3    Pendulums (lateral, fwd/back, CW, CCW) x20 ea x30 ea x30 ea x30 ea x30 ea x30 ea x30 ea x30 ea HEP    Elbow flexion 2x10 2x10 2x10 x10 unassisted  x20 assisted by L x20 unassisted x25 unassisted x25 unassisted x25 unassisted      Upper trap stretch nv 3x30" ea 3x30" ea 3x30" ea         Levator scap stretch nv  2x30" ea          Chin tuck  nv 2x10  5" hold 2x10  5" hold 3x10  5" hold         Scapular retraction 2x10  5" hold 2x10  5" hold 3x10  5" hold 3x10  5" hold 3x10  5" hold 3x10  5" hold 3x10  5" hold 3x10x5" 3x10  5" hold    Wrist extension nv 2x10 2x10 2x10 2x10 3x10 3x10      Wrist flexion nv 2x10 2x10 2x10 2x10 3x10 3x10      Wrist radial deviation nv 2x10 2x10 2x10 2x10 3x10 3x10      pullies     5' 5' 8' 6' 8'    AAROM flexion and ER     x15 ea x10 flexion  x15 ER x20 ea  improved x20 ea x30 ea    Serratus punches Modalities  9/4 9/9 9/11 9/19 9/23 9/25 9/30      CP PRN np 10'  post 8' post 8' post 8' post 8' post Deferred

## 2019-10-07 ENCOUNTER — OFFICE VISIT (OUTPATIENT)
Dept: PHYSICAL THERAPY | Facility: CLINIC | Age: 64
End: 2019-10-07
Payer: COMMERCIAL

## 2019-10-07 DIAGNOSIS — Z98.890 S/P RIGHT ROTATOR CUFF REPAIR: Primary | ICD-10-CM

## 2019-10-07 PROCEDURE — 97140 MANUAL THERAPY 1/> REGIONS: CPT

## 2019-10-07 PROCEDURE — 97112 NEUROMUSCULAR REEDUCATION: CPT

## 2019-10-07 PROCEDURE — 97110 THERAPEUTIC EXERCISES: CPT

## 2019-10-07 NOTE — PROGRESS NOTES
Daily Note     Today's date: 10/7/2019  Patient name: Mendez Parisi  :   MRN: [id number]  Referring provider: Evelina Kathleen MD  Dx:   Encounter Diagnosis     ICD-10-CM    1  S/P right rotator cuff repair Z98 890        Start Time: 1330  Stop Time: 1430  Total time in clinic (min): 60 minutes    Subjective: Patient noted that she feels better each day and has her follow up on Thursday with the surgeon  Objective: See treatment diary below      Assessment: PT introduced wall climbs and assisted serratus punches to assist c strength and ROM  Patient noted a slight twinge with wall climbs, however with cues the twinge decreased  Patient continues to improve with AAROM in flexion and ER, however continues to be limited by pain in PROM abduction  Patient would benefit from continued PT to allow the patient to return to her PLOF  Plan: Continue per plan of care        Precautions: s/p R RTC repair on 19  See protocol attached to chart  Week 4-6  Flexion: 120 degrees  Abduction: 120 degrees  ER: 60-90 degrees  IR to 30 degrees      Manual  9/4 9/9 9/11 9/16 9/19 9/23 9/25 9/30 10/3 107   R shoulder PROM per precautions MW MW MW MW MW MW MW MM MW MW   Re-eval         MW    STM to R UT          MW                                 Exercise Diary  9/4 9/9 9/11 9/16 9/19 9/23 9/25 9/30 10/3 10/7   Pendulums (lateral, fwd/back, CW, CCW) x20 ea x30 ea x30 ea x30 ea x30 ea x30 ea x30 ea x30 ea HEP    Elbow flexion 2x10 2x10 2x10 x10 unassisted  x20 assisted by L x20 unassisted x25 unassisted x25 unassisted x25 unassisted   x30   Upper trap stretch nv 3x30" ea 3x30" ea 3x30" ea         Levator scap stretch nv  2x30" ea          Chin tuck  nv 2x10  5" hold 2x10  5" hold 3x10  5" hold         Scapular retraction 2x10  5" hold 2x10  5" hold 3x10  5" hold 3x10  5" hold 3x10  5" hold 3x10  5" hold 3x10  5" hold 3x10x5" 3x10  5" hold 3x10  5" hold   Wrist extension nv 2x10 2x10 2x10 2x10 3x10 3x10   HEP Wrist flexion nv 2x10 2x10 2x10 2x10 3x10 3x10   HEP   Wrist radial deviation nv 2x10 2x10 2x10 2x10 3x10 3x10   HEP   pullies     5' 5' 8' 6' 8' 8'   AAROM flexion and ER     x15 ea x10 flexion  x15 ER x20 ea  improved x20 ea x30 ea x30 ea   Serratus punches          x10   Wall walks flexion          10x10" hold                                                                                                  Modalities  9/4 9/9 9/11 9/19 9/23 9/25 9/30      CP PRN np 10'  post 10' post 10' post 10' post 10' post Deferred

## 2019-10-11 ENCOUNTER — OFFICE VISIT (OUTPATIENT)
Dept: PHYSICAL THERAPY | Facility: CLINIC | Age: 64
End: 2019-10-11
Payer: COMMERCIAL

## 2019-10-11 DIAGNOSIS — Z98.890 S/P RIGHT ROTATOR CUFF REPAIR: Primary | ICD-10-CM

## 2019-10-11 PROCEDURE — 97140 MANUAL THERAPY 1/> REGIONS: CPT

## 2019-10-11 PROCEDURE — 97112 NEUROMUSCULAR REEDUCATION: CPT

## 2019-10-11 PROCEDURE — 97110 THERAPEUTIC EXERCISES: CPT

## 2019-10-11 NOTE — PROGRESS NOTES
Daily Note     Today's date: 10/11/2019  Patient name: Mendez Parisi  :   MRN: [id number]  Referring provider: Evelina Kathleen MD  Dx:   Encounter Diagnosis     ICD-10-CM    1  S/P right rotator cuff repair Z98 890        Start Time: 1100  Stop Time: 1200  Total time in clinic (min): 60 minutes    Subjective: Patient noted that her follow up appointment went well and has the following lifting restriction: <1#  Patient noted she is out of the brace and is now driving  Patient noted that she feels good and does not have pain at the start of the session  Objective: See treatment diary below      Assessment: PT introduced UBE and standing AAROM exercises to assist c functional activities  Patient noted no pain with new exercises , however did not fatigue  PT added TrA activation for posture and back pain due to the sling  Patient would benefit from continued PT to allow the patient to return to her PLOF  Plan: Continue per plan of care        Precautions: s/p R RTC repair on 19  See protocol attached to chart  No lifting > 1# until next follow up      Manual  10/11 9/9 9/11 9/16 9/19 9/23 9/25 9/30 10/3 10/7   R shoulder PROM per precautions MW MW MW MW MW MW MW MM MW MW   Re-eval         MW    STM to R UT          MW                                 Exercise Diary  10/11 9/9 9/11 9/16 9/19 9/23 9/25 9/30 10/3 10/7   UBE retro 5'            Pendulums (lateral, fwd/back, CW, CCW) HEP x30 ea x30 ea x30 ea x30 ea x30 ea x30 ea x30 ea HEP    Elbow flexion HEP 2x10 2x10 x10 unassisted  x20 assisted by L x20 unassisted x25 unassisted x25 unassisted x25 unassisted   x30   Upper trap stretch D/C 3x30" ea 3x30" ea 3x30" ea         Levator scap stretch D/C  2x30" ea          Chin tuck  2x10  5" hold 2x10  5" hold 2x10  5" hold 3x10  5" hold         Scapular retraction HEP 2x10  5" hold 3x10  5" hold 3x10  5" hold 3x10  5" hold 3x10  5" hold 3x10  5" hold 3x10x5" 3x10  5" hold 3x10  5" hold   Wrist extension HEP 2x10 2x10 2x10 2x10 3x10 3x10   HEP   Wrist flexion HEP 2x10 2x10 2x10 2x10 3x10 3x10   HEP   Wrist radial deviation HEP 2x10 2x10 2x10 2x10 3x10 3x10   HEP   pullies 5'    5' 5' 8' 6' 8' 8'   AAROM flexion and ER standing  Flexion  & abd  2x10    x15 ea x10 flexion  x15 ER x20 ea  improved x20 ea x30 ea x30 ea   Serratus punches 2x10 with cane         x10   Wall walks flexion 10x10" hold         10x10" hold   TrA 2x10  5" hold            TrA + ball squeeze 2x10  5" hold            TrA + band GTB  2x10  5" hld            sidelying ER                                                        Modalities  9/4 9/9 9/11 9/19 9/23 9/25 9/30      CP PRN np 10'  post 10' post 10' post 10' post 10' post Deferred

## 2019-10-14 ENCOUNTER — OFFICE VISIT (OUTPATIENT)
Dept: PHYSICAL THERAPY | Facility: CLINIC | Age: 64
End: 2019-10-14
Payer: COMMERCIAL

## 2019-10-14 DIAGNOSIS — Z98.890 S/P RIGHT ROTATOR CUFF REPAIR: Primary | ICD-10-CM

## 2019-10-14 PROCEDURE — 97110 THERAPEUTIC EXERCISES: CPT

## 2019-10-14 PROCEDURE — 97140 MANUAL THERAPY 1/> REGIONS: CPT

## 2019-10-14 PROCEDURE — 97112 NEUROMUSCULAR REEDUCATION: CPT

## 2019-10-14 NOTE — PROGRESS NOTES
Daily Note     Today's date: 10/14/2019  Patient name: Robb Walters  :   MRN: [id number]  Referring provider: Ruth Norris MD  Dx:   Encounter Diagnosis     ICD-10-CM    1  S/P right rotator cuff repair Z98 890        Start Time:   Stop Time:   Total time in clinic (min): 38 minutes    Subjective: Patient noted she felt good until she was sitting on her back porch over the weekend, then a lantern fly landed on her neck and her R UE moved quickly to get it off her neck  Since then she has felt a lot of pain in the biceps  Objective: See treatment diary below      Assessment: Session was limited 2* patient's pain and fatigue  Patient was able to perform exercises with minimal pain  Patient's PROM decreased compared to prior session due to quick motion over the weekend  Patient continued to maintain AROM  Patient would benefit from continued PT to allow the patient to return to her PLOF  Plan: Continue per plan of care        Precautions: s/p R RTC repair on 19  See protocol attached to chart  No lifting > 1# until next follow up      Manual  10/11 10/14 9/11 9/16 9/19 9/23 9/25 9/30 10/3 107   R shoulder PROM per precautions MW MW MW MW MW MW MW MM MW MW   Re-eval         MW    STM to R UT          MW                                 Exercise Diary  10/11 10/14 9/11 9/16 9/19 9/23 9/25 9/30 10/3 107   UBE retro 5' 5'           Pendulums (lateral, fwd/back, CW, CCW) HEP x30 ea x30 ea x30 ea x30 ea x30 ea x30 ea x30 ea HEP    Elbow flexion HEP  2x10 x10 unassisted  x20 assisted by L x20 unassisted x25 unassisted x25 unassisted x25 unassisted   x30   Upper trap stretch D/C  3x30" ea 3x30" ea         Levator scap stretch D/C  2x30" ea          Chin tuck  2x10  5" hold  2x10  5" hold 3x10  5" hold         Scapular retraction HEP 2x10  5" hold 3x10  5" hold 3x10  5" hold 3x10  5" hold 3x10  5" hold 3x10  5" hold 3x10x5" 3x10  5" hold 3x10  5" hold   Wrist extension HEP  2x10 2x10 2x10 3x10 3x10   HEP   Wrist flexion HEP  2x10 2x10 2x10 3x10 3x10   HEP   Wrist radial deviation HEP  2x10 2x10 2x10 3x10 3x10   HEP   pullies 5' 5'   5' 5' 8' 6' 8' 8'   AAROM flexion and ER standing  Flexion  & abd  2x10 Supine  flexion, ER  2x10 ea   x15 ea x10 flexion  x15 ER x20 ea  improved x20 ea x30 ea x30 ea   Serratus punches 2x10 with cane 2x10 with cane        x10   Wall walks flexion 10x10" hold held        10x10" hold   TrA 2x10  5" hold            TrA + ball squeeze 2x10  5" hold            TrA + band GTB  2x10  5" hld            sidelying ER  nv                                                      Modalities  9/4 9/9 9/11 9/19 9/23 9/25 9/30      CP PRN np 10'  post 10' post 10' post 10' post 10' post Deferred

## 2019-10-17 ENCOUNTER — OFFICE VISIT (OUTPATIENT)
Dept: PHYSICAL THERAPY | Facility: CLINIC | Age: 64
End: 2019-10-17
Payer: COMMERCIAL

## 2019-10-17 DIAGNOSIS — Z98.890 S/P RIGHT ROTATOR CUFF REPAIR: Primary | ICD-10-CM

## 2019-10-17 PROCEDURE — 97140 MANUAL THERAPY 1/> REGIONS: CPT

## 2019-10-17 PROCEDURE — 97112 NEUROMUSCULAR REEDUCATION: CPT

## 2019-10-17 NOTE — PROGRESS NOTES
Daily Note     Today's date: 10/17/2019  Patient name: Monse Cochran  :   MRN: [id number]  Referring provider: Marvin Dutta MD  Dx:   Encounter Diagnosis     ICD-10-CM    1  S/P right rotator cuff repair Z98 890        Start Time: 1110  Stop Time: 1200  Total time in clinic (min): 50 minutes    Subjective: Patient noted she is starting to feel better after the quick motion over the weekend  Objective: See treatment diary below      Assessment: Patient continues to improve with abduction and flexion PROM, however continues to be limited in internal/external ROM  Patient continues to get cramping every once in a while  PT introduced sidelying ER to assist c stability and strength  Patient noted no pain with new exercise  Patient would benefit from continued PT to allow the patient to return to her PLOF  Plan: Continue per plan of care        Precautions: s/p R RTC repair on 19  See protocol attached to chart  No lifting > 1# until next follow up      Manual  10/11 10/14 10/17 9/16 9/19 9/23 9/25 9/30 10/3 10/7   R shoulder PROM per precautions MW MW MW MW MW MW MW MM MW MW   Re-eval         MW    STM to R UT          MW                                 Exercise Diary  10/11 10/14 10/17 9/16 9/19 9/23 9/25 9/30 10/3 10/7   UBE retro 5' 5' X-ride  5'          Pendulums (lateral, fwd/back, CW, CCW) HEP x30 ea  x30 ea x30 ea x30 ea x30 ea x30 ea HEP    Elbow flexion HEP   x10 unassisted  x20 assisted by L x20 unassisted x25 unassisted x25 unassisted x25 unassisted   x30   Upper trap stretch D/C   3x30" ea         Levator scap stretch D/C            Chin tuck  2x10  5" hold  3x10  5" hold 3x10  5" hold         Scapular retraction HEP 2x10  5" hold 3x10  5" hold 3x10  5" hold 3x10  5" hold 3x10  5" hold 3x10  5" hold 3x10x5" 3x10  5" hold 3x10  5" hold   Wrist extension HEP   2x10 2x10 3x10 3x10   HEP   Wrist flexion HEP   2x10 2x10 3x10 3x10   HEP   Wrist radial deviation HEP   2x10 2x10 3x10 3x10   HEP   pullies 5' 5' 8'  5' 5' 8' 6' 8' 8'   AAROM flexion and ER standing  Flexion  & abd  2x10 Supine  flexion, ER  2x10 ea Supine  flexion, ER  3x10 ea  x15 ea x10 flexion  x15 ER x20 ea  improved x20 ea x30 ea x30 ea   Serratus punches 2x10 with cane 2x10 with cane 3x10       x10   Wall walks flexion 10x10" hold held        10x10" hold   TrA 2x10  5" hold            TrA + ball squeeze 2x10  5" hold            TrA + band GTB  2x10  5" hld            sidelying ER  nv 2x10                                                     Modalities  9/4 9/9 9/11 9/19 9/23 9/25 9/30      CP PRN np 10'  post 10' post 10' post 10' post 10' post Deferred

## 2019-10-21 ENCOUNTER — OFFICE VISIT (OUTPATIENT)
Dept: PHYSICAL THERAPY | Facility: CLINIC | Age: 64
End: 2019-10-21
Payer: COMMERCIAL

## 2019-10-21 DIAGNOSIS — Z98.890 S/P RIGHT ROTATOR CUFF REPAIR: Primary | ICD-10-CM

## 2019-10-21 PROCEDURE — 97112 NEUROMUSCULAR REEDUCATION: CPT | Performed by: PHYSICAL THERAPIST

## 2019-10-21 PROCEDURE — 97140 MANUAL THERAPY 1/> REGIONS: CPT | Performed by: PHYSICAL THERAPIST

## 2019-10-21 PROCEDURE — 97110 THERAPEUTIC EXERCISES: CPT | Performed by: PHYSICAL THERAPIST

## 2019-10-21 NOTE — PROGRESS NOTES
Daily Note     Today's date: 10/21/2019  Patient name: Jerrell Hernandez  :   MRN: [id number]  Referring provider: Len Sethi MD  Dx:   Encounter Diagnosis     ICD-10-CM    1  S/P right rotator cuff repair Z98 890                   Subjective: patient reports she has been getting some muscle spasms, she asked if she could not start on x-ride because the ROM is uncomfortable on her arm  Objective: See treatment diary below      Assessment: patient tolerated treatment session well  She continues to demonstrate improvements in her PROM, most limited in IR/ER  She demonstrates and reports muscle spasms throughout the session which seemed to "calm down" after manual therapy  She will benefit from continued PT to enable her to return to PLOF  Plan: Continue per plan of care  Progress treament per protocol        Precautions: s/p R RTC repair on 19  See protocol attached to chart  No lifting > 1# until next follow up      Manual  10/11 10/14 10/17 10/21 9/19 9/23 9/25 9/30 10/3 10/7   R shoulder PROM per precautions MW MW MW SK MW MW MW MM MW MW   Re-eval         MW    STM to R UT          MW                                 Exercise Diary  10/11 10/14 10/17 10/21 9/19 9/23 9/25 9/30 10/3 10/7   UBE retro 5' 5' X-ride  5' np         Pendulums (lateral, fwd/back, CW, CCW) HEP x30 ea   x30 ea x30 ea x30 ea x30 ea HEP    Elbow flexion HEP    x20 unassisted x25 unassisted x25 unassisted x25 unassisted   x30   Upper trap stretch D/C            Levator scap stretch D/C            Chin tuck  2x10  5" hold  3x10  5" hold 3x10  5" hold         Scapular retraction HEP 2x10  5" hold 3x10  5" hold 3x10 5" hold 3x10  5" hold 3x10  5" hold 3x10  5" hold 3x10x5" 3x10  5" hold 3x10  5" hold   Wrist extension HEP    2x10 3x10 3x10   HEP   Wrist flexion HEP    2x10 3x10 3x10   HEP   Wrist radial deviation HEP    2x10 3x10 3x10   HEP   pullies 5' 5' 8' 8' 5' 5' 8' 6' 8' 8'   AAROM flexion and ER standing  Flexion  & abd  2x10 Supine  flexion, ER  2x10 ea Supine  flexion, ER  3x10 ea Supine  flexion, ER  3x10 ea x15 ea x10 flexion  x15 ER x20 ea  improved x20 ea x30 ea x30 ea   Serratus punches 2x10 with cane 2x10 with cane 3x10 3x10      x10   Wall walks flexion 10x10" hold held        10x10" hold   TrA 2x10  5" hold            TrA + ball squeeze 2x10  5" hold            TrA + band GTB  2x10  5" hld            sidelying ER  nv 2x10 2x10                                                    Modalities  9/4 9/9 9/11 9/19 9/23 9/25 9/30      CP PRN np 10'  post 10' post 10' post 10' post 8' post Deferred

## 2019-10-24 ENCOUNTER — OFFICE VISIT (OUTPATIENT)
Dept: PHYSICAL THERAPY | Facility: CLINIC | Age: 64
End: 2019-10-24
Payer: COMMERCIAL

## 2019-10-24 DIAGNOSIS — Z98.890 S/P RIGHT ROTATOR CUFF REPAIR: Primary | ICD-10-CM

## 2019-10-24 PROCEDURE — 97140 MANUAL THERAPY 1/> REGIONS: CPT

## 2019-10-24 PROCEDURE — 97112 NEUROMUSCULAR REEDUCATION: CPT

## 2019-10-24 PROCEDURE — 97110 THERAPEUTIC EXERCISES: CPT

## 2019-10-24 NOTE — PROGRESS NOTES
Daily Note     Today's date: 10/24/2019  Patient name: Allison Ling  :   MRN: [id number]  Referring provider: Violette Pineda MD  Dx:   Encounter Diagnosis     ICD-10-CM    1  S/P right rotator cuff repair Z98 890        Start Time: 56  Stop Time: 1130  Total time in clinic (min): 60 minutes    Subjective: Patient noted that the pulley's bothered her from last session near the biceps area  Patient noted that she is able to reach a coffee cup on a shelf without pain  Objective: See treatment diary below      Assessment: PT held sidelying ER 2* patient's pain in the L shoulder when in a sidelying position  PT introduced no monies and prone I and Ts to assist c functional activities  Patient performed without pain, however did not fatigue  Reps were decreased due to fatigue  Patient noted relief after STM to the R proximal biceps tendon  Patient would benefit from continued PT to allow the patient to return to her PLOF  Plan: Continue per plan of care        Precautions: s/p R RTC repair on 19  See protocol attached to chart  No lifting > 1# until next follow up      Manual  10/11 10/14 10/17 10/21 10/24 9/23 9/25 9/30 10/3 10/7   R shoulder PROM per precautions MW MW MW SK MW MW MW MM MW MW   Re-eval         MW    STM to R UT     R bicepstenond  MW     MW                                 Exercise Diary  10/11 10/14 10/17 10/21 10/24 9/23 9/25 9/30 10/3 10/7   UBE retro 5' 5' X-ride  5' np         Pendulums (lateral, fwd/back, CW, CCW) HEP x30 ea    x30 ea x30 ea x30 ea HEP    Elbow flexion HEP     x25 unassisted x25 unassisted x25 unassisted   x30   Upper trap stretch D/C            Levator scap stretch D/C            Chin tuck  2x10  5" hold  3x10  5" hold 3x10  5" hold 3x10  5" hold        Scapular retraction HEP 2x10  5" hold 3x10  5" hold 3x10 5" hold 3x10  5" hold 3x10  5" hold 3x10  5" hold 3x10x5" 3x10  5" hold 3x10  5" hold   Shoulder ER stretch on wedge at table     10x10" hold        Shoulder IR/ER band     x10 ea        Prone I and T     x10 ea        pullies 5' 5' 8' 8' 8' 5' 8' 6' 8' 8'   AAROM flexion and ER standing  Flexion  & abd  2x10 Supine  flexion, ER  2x10 ea Supine  flexion, ER  3x10 ea Supine  flexion, ER  3x10 ea 2x10 ea x10 flexion  x15 ER x20 ea  improved x20 ea x30 ea x30 ea   Serratus punches 2x10 with cane 2x10 with cane 3x10 3x10 3x10     x10   Wall walks flexion 10x10" hold held   10x10" hold     10x10" hold   TrA 2x10  5" hold    HEP        TrA + ball squeeze 2x10  5" hold    HEP        TrA + band GTB  2x10  5" hld    HEP        sidelying ER  nv 2x10 2x10 nv        No money     x10                                      Modalities  9/4 9/9 9/11 9/19 9/23 9/25 9/30      CP PRN np 10'  post 10' post 10' post 10' post 10' post Deferred

## 2019-10-28 ENCOUNTER — OFFICE VISIT (OUTPATIENT)
Dept: PHYSICAL THERAPY | Facility: CLINIC | Age: 64
End: 2019-10-28
Payer: COMMERCIAL

## 2019-10-28 DIAGNOSIS — Z98.890 S/P RIGHT ROTATOR CUFF REPAIR: Primary | ICD-10-CM

## 2019-10-28 PROCEDURE — 97112 NEUROMUSCULAR REEDUCATION: CPT

## 2019-10-28 PROCEDURE — 97110 THERAPEUTIC EXERCISES: CPT

## 2019-10-28 PROCEDURE — 97140 MANUAL THERAPY 1/> REGIONS: CPT

## 2019-10-28 NOTE — PROGRESS NOTES
Daily Note     Today's date: 10/28/2019  Patient name: Jefferson Green  :   MRN: [id number]  Referring provider: Min Phelan MD  Dx:   Encounter Diagnosis     ICD-10-CM    1  S/P right rotator cuff repair Z98 890        Start Time:   Stop Time:   Total time in clinic (min): 53 minutes    Subjective: Patient noted that her cramping has decreased, however she had some pain since it rained this weekend  Objective: See treatment diary below      Assessment: Patient performed cane exercises standing to assist c strength and ROM  Patient noted difficulty with AAROM shoulder abduction  Patient demonstrated improvements with no monies and was able to perform sidelying ER without pain today  Patient improved with PROM post manuals  Patient would benefit from continued PT to allow the patient to return to her PLOF  Plan: Continue per plan of care        Precautions: s/p R RTC repair on 19  See protocol attached to chart  No lifting > 1# until next follow up      Manual  10/11 10/14 10/17 10/21 10/24 10/28 9/25 9/30 10/3 10/7   R shoulder PROM per precautions MW MW MW SK MW MW MW MM MW MW   Re-eval         MW    STM to R UT     R bicepstenond  MW MW    MW   Posterior and inferior mobs R shoulder      Grade II  MW                        Exercise Diary  10/11 10/14 10/17 10/21 10/24 10/28 9/25 9/30 10/3 10/7   UBE retro 5' 5' X-ride  5' np         Pendulums (lateral, fwd/back, CW, CCW) HEP x30 ea     x30 ea x30 ea HEP    Elbow flexion HEP      x25 unassisted x25 unassisted   x30   Upper trap stretch D/C            Levator scap stretch D/C            Chin tuck  2x10  5" hold  3x10  5" hold 3x10  5" hold 3x10  5" hold        Scapular retraction HEP 2x10  5" hold 3x10  5" hold 3x10 5" hold 3x10  5" hold 3x10  5" hold 3x10  5" hold 3x10x5" 3x10  5" hold 3x10  5" hold   Shoulder ER stretch on wedge at table     10x10" hold        Shoulder IR/ER band     x10 ea 2x10 ea       Prone I and T x10 ea 2x10 ea       pullies 5' 5' 8' 8' 8' 8' 8' 6' 8' 8'   AAROM flexion and ER standing  Flexion  & abd  2x10 Supine  flexion, ER  2x10 ea Supine  flexion, ER  3x10 ea Supine  flexion, ER  3x10 ea 2x10 ea standing flex and abd  2x10 ea x20 ea  improved x20 ea x30 ea x30 ea   Serratus punches 2x10 with cane 2x10 with cane 3x10 3x10 3x10 3x10    x10   Wall walks flexion 10x10" hold held   10x10" hold 10x10" hold    10x10" hold   TrA 2x10  5" hold    HEP        TrA + ball squeeze 2x10  5" hold    HEP        TrA + band GTB  2x10  5" hld    HEP        sidelying ER  nv 2x10 2x10 nv 2x10        No money     x10 2x10                                     Modalities  9/4 9/9 9/11 9/19 9/23 9/25 9/30      CP PRN np 10'  post 10' post 10' post 10' post 10' post Deferred

## 2019-10-31 ENCOUNTER — OFFICE VISIT (OUTPATIENT)
Dept: PHYSICAL THERAPY | Facility: CLINIC | Age: 64
End: 2019-10-31
Payer: COMMERCIAL

## 2019-10-31 DIAGNOSIS — Z98.890 S/P RIGHT ROTATOR CUFF REPAIR: Primary | ICD-10-CM

## 2019-10-31 PROCEDURE — 97110 THERAPEUTIC EXERCISES: CPT

## 2019-10-31 PROCEDURE — 97112 NEUROMUSCULAR REEDUCATION: CPT

## 2019-10-31 PROCEDURE — 97140 MANUAL THERAPY 1/> REGIONS: CPT

## 2019-10-31 NOTE — PROGRESS NOTES
Daily Note     Today's date: 10/31/2019  Patient name: Jamal Delgado  :   MRN: [id number]  Referring provider: Carina Smalls MD  Dx:   Encounter Diagnosis     ICD-10-CM    1  S/P right rotator cuff repair Z98 890        Start Time: 1430  Stop Time: 82  Total time in clinic (min): 60 minutes    Subjective: Patient noted that she continues to note improvements with ROM at home  Patient noted she is able to wash her hair with two hands  Objective: See treatment diary below      Assessment: PT noted restrictions surrounding the acromioclavicular joint on the R side with PROM shoulder flexion  After trigger point release, the patient improved with PROM shoulder flexion and pain levels  Patient performed exercises with a 1# weight without pain  Patient required min VCs throughout the session  Patient would benefit from continued PT to allow the patient to return to her PLOF  Plan: Continue per plan of care        Precautions: s/p R RTC repair on 19  See protocol attached to chart  No lifting > 1# until next follow up      Manual  10/11 10/14 10/17 10/21 10/24 10/28 10/31 9/30 10/3 10/7   R shoulder PROM per precautions MW MW MW SK MW MW MW MM MW MW   Re-eval         MW    STM to R UT     R bicepstenond  MW MW MW   MW   Posterior and inferior mobs R shoulder      Grade II  MW Grade  II  MW                       Exercise Diary  10/11 10/14 10/17 10/21 10/24 10/28 10/31 9/30 10/3 107   UBE retro 5' 5' X-ride  5' np         Pendulums (lateral, fwd/back, CW, CCW) HEP x30 ea      x30 ea HEP    Elbow flexion HEP       x25 unassisted   x30   Upper trap stretch D/C            Levator scap stretch D/C            Chin tuck  2x10  5" hold  3x10  5" hold 3x10  5" hold 3x10  5" hold        Scapular retraction HEP 2x10  5" hold 3x10  5" hold 3x10 5" hold 3x10  5" hold 3x10  5" hold 3x10  5" hold 3x10x5" 3x10  5" hold 3x10  5" hold   Shoulder ER stretch on wedge at table     10x10" hold Shoulder IR/ER band     x10 ea 2x10 ea OTB  2x10 ea      Prone I and T     x10 ea 2x10 ea       pullies 5' 5' 8' 8' 8' 8' 8' 6' 8' 8'   AAROM flexion and ER standing  Flexion  & abd  2x10 Supine  flexion, ER  2x10 ea Supine  flexion, ER  3x10 ea Supine  flexion, ER  3x10 ea 2x10 ea standing flex and abd  2x10 ea standing  2x10 ea x20 ea x30 ea x30 ea   Serratus punches 2x10 with cane 2x10 with cane 3x10 3x10 3x10 3x10 2x10  1#   x10   Wall walks flexion 10x10" hold held   10x10" hold 10x10" hold 10x10" hold   10x10" hold   TrA 2x10  5" hold    HEP        TrA + ball squeeze 2x10  5" hold    HEP        TrA + band GTB  2x10  5" hld    HEP        sidelying ER  nv 2x10 2x10 nv 2x10  2x10  1#      No money     x10 2x10 2x10                                    Modalities  9/4 9/9 9/11 9/19 9/23 9/25 9/30      CP PRN np 10'  post 10' post 10' post 10' post 10' post Deferred

## 2019-11-04 ENCOUNTER — OFFICE VISIT (OUTPATIENT)
Dept: PHYSICAL THERAPY | Facility: CLINIC | Age: 64
End: 2019-11-04
Payer: COMMERCIAL

## 2019-11-04 DIAGNOSIS — Z98.890 S/P RIGHT ROTATOR CUFF REPAIR: Primary | ICD-10-CM

## 2019-11-04 PROCEDURE — 97140 MANUAL THERAPY 1/> REGIONS: CPT

## 2019-11-04 PROCEDURE — 97110 THERAPEUTIC EXERCISES: CPT

## 2019-11-04 NOTE — PROGRESS NOTES
Daily Note     Today's date: 2019  Patient name: Fernando Pereira  :   MRN: [id number]  Referring provider: Kelle Eric MD  Dx:   Encounter Diagnosis     ICD-10-CM    1  S/P right rotator cuff repair Z98 890        Start Time: 1000  Stop Time: 1100  Total time in clinic (min): 60 minutes    Subjective: Patient noted over the weekend she had to use a ladder for a leak in her windows  Patient noted increased soreness on the R deltoid  Objective: See treatment diary below      Assessment: Exercises were limited 2* soreness in the deltoid  Patient performed AAROM exercises with cues for pain free ranges  Patient continues to improve with PROM after mobilizations were performed  Patient would benefit from continued PT to allow the patient to return to her PLOF  Plan: Continue per plan of care        Precautions: s/p R RTC repair on 19  See protocol attached to chart  No lifting > 1# until next follow up      Manual  10/11 10/14 10/17 10/21 10/24 10/28 10/31 11/4 10/3 10/7   R shoulder PROM per precautions MW MW MW SK MW MW MW MW MW MW   Re-eval         MW    STM to R UT     R bicepstenond  MW MW MW R deltoid  MW   Posterior and inferior mobs R shoulder      Grade II  MW Grade  II  MW MW  Grade II                      Exercise Diary  10/11 10/14 10/17 10/21 10/24 10/28 10/31 11/4 10/3 10/7   UBE retro 5' 5' X-ride  5' np    5'      Pendulums (lateral, fwd/back, CW, CCW) HEP x30 ea           Elbow flexion HEP            Upper trap stretch D/C            Levator scap stretch D/C            Chin tuck  2x10  5" hold  3x10  5" hold 3x10  5" hold 3x10  5" hold        Scapular retraction HEP 2x10  5" hold 3x10  5" hold 3x10 5" hold 3x10  5" hold 3x10  5" hold 3x10  5" hold  3x10  5" hold 3x10  5" hold   Shoulder ER stretch on wedge at table     10x10" hold   Table top shoulder abduction stretch  10x10"     Shoulder IR/ER band     x10 ea 2x10 ea OTB  2x10 ea      Prone I and T     x10 ea 2x10 ea       pullies 5' 5' 8' 8' 8' 8' 8' 5' 8' 8'   AAROM flexion and ER standing  Flexion  & abd  2x10 Supine  flexion, ER  2x10 ea Supine  flexion, ER  3x10 ea Supine  flexion, ER  3x10 ea 2x10 ea standing flex and abd  2x10 ea standing  2x10 ea x20 ea x30 ea x30 ea   Serratus punches 2x10 with cane 2x10 with cane 3x10 3x10 3x10 3x10 2x10  1# 2x10 1#  x10   Wall walks flexion 10x10" hold held   10x10" hold 10x10" hold 10x10" hold   10x10" hold   TrA 2x10  5" hold    HEP        TrA + ball squeeze 2x10  5" hold    HEP        TrA + band GTB  2x10  5" hld    HEP        sidelying ER  nv 2x10 2x10 nv 2x10  2x10  1#      No money     x10 2x10 2x10                                    Modalities  9/4 9/9 9/11 9/19 9/23 9/25 9/30      CP PRN np 10'  post 10' post 10' post 10' post 10' post Deferred

## 2019-11-07 ENCOUNTER — OFFICE VISIT (OUTPATIENT)
Dept: PHYSICAL THERAPY | Facility: CLINIC | Age: 64
End: 2019-11-07
Payer: COMMERCIAL

## 2019-11-07 DIAGNOSIS — Z98.890 S/P RIGHT ROTATOR CUFF REPAIR: Primary | ICD-10-CM

## 2019-11-07 PROCEDURE — 97110 THERAPEUTIC EXERCISES: CPT

## 2019-11-07 PROCEDURE — 97140 MANUAL THERAPY 1/> REGIONS: CPT

## 2019-11-07 NOTE — PROGRESS NOTES
PT Re-Evaluation     Today's date: 2019  Patient name: Bacilio Nash  : 1955  MRN: [id number]  Referring provider: Paras Georges MD  Dx:   Encounter Diagnosis     ICD-10-CM    1  S/P right rotator cuff repair Z98 890        Start Time: 56  Stop Time: 1130  Total time in clinic (min): 60 minutes    Assessment  Assessment details: Patient continues to progress with PROM and AROM as well as strength and muscle endurance  Patient is compliant with 1# restrictions  Patient is able to complete a majority of ADLs, however is limited at times due to strength and ROM  Patient noted reaching has improved  Patient continues to be limited in shoulder ER strength and shoulder abduction & IR ROM  PT will continue to address the noted impairments by performing shoulder and scapular strengthening, stretching, posture training, functional activities and manual techniques per protocol to allow the patient to return to her PLOF  PT recommended 2x/week for 4-6 weeks c a good prognosis 2* noted progress  Impairments: abnormal or restricted ROM, activity intolerance, impaired physical strength, lacks appropriate home exercise program, pain with function, safety issue and poor posture     Symptom irritability: lowUnderstanding of Dx/Px/POC: good   Prognosis: good    Goals  STG: In three weeks the patient will:    1  Be (I) with her HEP  (50% MET)  2  Increase PROM of the R shoulderto 90 degrees of flexion  (MET)  3  Increase PROM of the R shoulderto 90 degrees of abduction  (MET)  4  Increase PROM of the R shoulder to 45 degrees of ER and 20 degrees of IR  (MET)      LTG: In six weeks, the patient will:    1  Increase FOTO score to 56 to demonstrate improvements in symptoms and function  (IN progress)  2  Demonstrate R shoudler PROM to the followin degrees of flexion and abduction, ER to 60-90 degrees and IR to 30 degrees (MET)  3  D/C sling  (MET)    In 6-8 week the patient will:   1   Increase R shoulder full PROM to the followin degrees of flexion and abduction; 90 degrees of ER and 30 degrees of IR  (80% MET)  2  Increase strength to 4+/5 in scapular strength to assist c ADLs  (50% MET)  3  Return to work with out pain  4  Increase strength to 5/5 in R shoulder and scapular strength to assist c work related activities  Plan  Patient would benefit from: skilled physical therapy  Referral necessary: No  Planned modality interventions: cryotherapy and thermotherapy: hydrocollator packs  Planned therapy interventions: abdominal trunk stabilization, joint mobilization, manual therapy, massage, Silveira taping, neuromuscular re-education, patient education, postural training, strengthening, stretching, therapeutic activities, therapeutic exercise, home exercise program, functional ROM exercises and body mechanics training  Frequency: 2x week  Duration in visits: 12  Duration in weeks: 6  Plan of Care beginning date: 2019  Plan of Care expiration date: 2019  Treatment plan discussed with: patient        Subjective Evaluation    History of Present Illness  Mechanism of injury: Patient noted that she is 65% back to her PLOF  Patient noted that she is able to get dressed with some difficulty with reaching behind her back  Patient is able to reach higher and is compliant with the 1# restriction  Patient noted she is able to gonzalo a coat with no restrictions from the R UE  Patient noted she is continuing to have cramps in the R shoulder from time to time             Recurrent probem    Quality of life: good    Pain  Current pain ratin  At best pain ratin (resting)  At worst pain ratin (when cramping)  Location: R anterior shoulder  Quality: throbbing, cramping and sharp  Relieving factors: ice and rest  Aggravating factors: lifting and overhead activity  Progression: improved    Social Support  Steps to enter house: yes (3 BRADLY)  Lives in: [facility]  Lives with: alone    Employment status: working ([employer] in November)  Hand dominance: right      Diagnostic Tests  MRI studies: abnormal  Patient Goals  Patient goals for therapy: increased strength, independence with ADLs/IADLs, return to sport/leisure activities, return to work, increased motion and decreased pain  Patient goal: "to get dressed, drive car and eat without pain " (80% MET)        Objective     Postural Observations  Seated posture: fair  Standing posture: fair  Correction of posture: makes symptoms better        Cervical/Thoracic Screen   Cervical range of motion within normal limits with the following exceptions: Patient has restriction in the following: cervical extension, sidebending and rotation       Neurological Testing     Sensation     Shoulder   Left Shoulder   Intact: light touch    Right Shoulder   Intact: light touch  Diminished: light touch    Comments   Right light touch: Diminished at C5    Active Range of Motion   Left Shoulder   Normal active range of motion    Right Shoulder   Flexion: 150 degrees   Abduction: 90 degrees   External rotation BTH: C5   Internal rotation BTB: sacrum     Passive Range of Motion   Left Shoulder   Normal passive range of motion    Right Shoulder   Flexion: 151 degrees   Abduction: 138 degrees with pain  External rotation 0°: 50 degrees   External rotation 45°: 68 degrees   Internal rotation 45°: 50 degrees     Strength/Myotome Testing     Left Shoulder     Planes of Motion   Flexion: 4+   Abduction: 4+   External rotation at 0°: 4   Internal rotation at 0°: 4+     Isolated Muscles   Biceps: 4+   Triceps: 4+     Right Shoulder     Planes of Motion   Flexion: 4   Abduction: 4   External rotation at 0°: 4-   Internal rotation at 0°: 4     Isolated Muscles   Biceps: 4   Upper trapezius: 4       Flowsheet Rows      Most Recent Value   PT/OT G-Codes   Current Score  50   Projected Score  56             Precautions: s/p R RTC repair on 8/28/19  See protocol attached to chart  No lifting > 1# until next follow up      Manual  10/11 10/14 10/17 10/21 10/24 10/28 10/31 11/4 11/7 10/7   R shoulder PROM per precautions MW MW MW SK MW MW MW MW MW MW   Re-eval         MW    STM to R UT     R bicepstenond  MW MW MW R deltoid  MW   Posterior and inferior mobs R shoulder      Grade II  MW Grade  II  MW MW  Grade II MW  Grade  II                     Exercise Diary  10/11 10/14 10/17 10/21 10/24 10/28 10/31 11/4 11/7 10/7   UBE retro 5' 5' X-ride  5' np    5'  5'    Pendulums (lateral, fwd/back, CW, CCW) HEP x30 ea           Elbow flexion HEP            Upper trap stretch D/C            Levator scap stretch D/C            Chin tuck  2x10  5" hold  3x10  5" hold 3x10  5" hold 3x10  5" hold        Scapular retraction HEP 2x10  5" hold 3x10  5" hold 3x10 5" hold 3x10  5" hold 3x10  5" hold 3x10  5" hold  3x10  5" hold 3x10  5" hold   Shoulder ER stretch on wedge at table     10x10" hold   Table top shoulder abduction stretch  10x10" 10x10" hold    Shoulder IR/ER band     x10 ea 2x10 ea OTB  2x10 ea      Prone I and T     x10 ea 2x10 ea       pullies 5' 5' 8' 8' 8' 8' 8' 5' 8' 8'   AAROM flexion and ER standing  Flexion  & abd  2x10 Supine  flexion, ER  2x10 ea Supine  flexion, ER  3x10 ea Supine  flexion, ER  3x10 ea 2x10 ea standing flex and abd  2x10 ea standing  2x10 ea x20 ea x30 ea x30 ea   Serratus punches 2x10 with cane 2x10 with cane 3x10 3x10 3x10 3x10 2x10  1# 2x10 1#  x10   Wall walks flexion 10x10" hold held   10x10" hold 10x10" hold 10x10" hold   10x10" hold   TrA 2x10  5" hold    HEP        TrA + ball squeeze 2x10  5" hold    HEP        TrA + band GTB  2x10  5" hld    HEP        sidelying ER  nv 2x10 2x10 nv 2x10  2x10  1#      No money     x10 2x10 2x10                                    Modalities  9/4 9/9 9/11 9/19 9/23 9/25 9/30      CP PRN np 10'  post 10' post 10' post 10' post 10' post Deferred

## 2019-11-07 NOTE — LETTER
2019    Doc MD Venus  C/Cortes Nogueiralla    Patient: Roland Gomez   YOB: 1955   Date of Visit: 2019     Encounter Diagnosis     ICD-10-CM    1  S/P right rotator cuff repair V85 [id number]        Dear Dr Reyes Dark: Thank you for your recent referral of Roland Gomez  Please review the attached evaluation summary from Mary Beth's recent visit  Please verify that you agree with the plan of care by signing the attached order  If you have any questions or concerns, please do not hesitate to call  I sincerely appreciate the opportunity to share in the care of one of your patients and hope to have another opportunity to work with you in the near future  Sincerely,    Mark Anthony Galloway, PT      Referring Provider:      I certify that I have read the below Plan of Care and certify the need for these services furnished under this plan of treatment while under my care  Doc MD Venus  [address] [place]  [place] [address]: [phone]          PT Re-Evaluation     Today's date: 2019  Patient name: Roland Gomez  :   MRN: [id number]  Referring provider: Shane Arellano MD  Dx:   Encounter Diagnosis     ICD-10-CM    1  S/P right rotator cuff repair Z98 890        Start Time: 56  Stop Time: 1130  Total time in clinic (min): 60 minutes    Assessment  Assessment details: Patient continues to progress with PROM and AROM as well as strength and muscle endurance  Patient is compliant with 1# restrictions  Patient is able to complete a majority of ADLs, however is limited at times due to strength and ROM  Patient noted reaching has improved  Patient continues to be limited in shoulder ER strength and shoulder abduction & IR ROM   PT will continue to address the noted impairments by performing shoulder and scapular strengthening, stretching, posture training, functional activities and manual techniques per protocol to allow the patient to return to her PLOF  PT recommended 2x/week for 4-6 weeks c a good prognosis 2* noted progress  Impairments: abnormal or restricted ROM, activity intolerance, impaired physical strength, lacks appropriate home exercise program, pain with function, safety issue and poor posture     Symptom irritability: lowUnderstanding of Dx/Px/POC: good   Prognosis: good    Goals  STG: In three weeks the patient will:    1  Be (I) with her HEP  (50% MET)  2  Increase PROM of the R shoulderto 90 degrees of flexion  (MET)  3  Increase PROM of the R shoulderto 90 degrees of abduction  (MET)  4  Increase PROM of the R shoulder to 45 degrees of ER and 20 degrees of IR  (MET)      LTG: In six weeks, the patient will:    1  Increase FOTO score to 56 to demonstrate improvements in symptoms and function  (IN progress)  2  Demonstrate R shoudler PROM to the followin degrees of flexion and abduction, ER to 60-90 degrees and IR to 30 degrees (MET)  3  D/C sling  (MET)    In 6-8 week the patient will:   1  Increase R shoulder full PROM to the followin degrees of flexion and abduction; 90 degrees of ER and 30 degrees of IR  (80% MET)  2  Increase strength to 4+/5 in scapular strength to assist c ADLs  (50% MET)  3  Return to work with out pain  4  Increase strength to 5/5 in R shoulder and scapular strength to assist c work related activities           Plan  Patient would benefit from: skilled physical therapy  Referral necessary: No  Planned modality interventions: cryotherapy and thermotherapy: hydrocollator packs  Planned therapy interventions: abdominal trunk stabilization, joint mobilization, manual therapy, massage, Silveira taping, neuromuscular re-education, patient education, postural training, strengthening, stretching, therapeutic activities, therapeutic exercise, home exercise program, functional ROM exercises and body mechanics training  Frequency: 2x week  Duration in visits: 12  Duration in weeks: 6  Plan of Care beginning date: 2019  Plan of Care expiration date: 2019  Treatment plan discussed with: patient        Subjective Evaluation    History of Present Illness  Mechanism of injury: Patient noted that she is 65% back to her PLOF  Patient noted that she is able to get dressed with some difficulty with reaching behind her back  Patient is able to reach higher and is compliant with the 1# restriction  Patient noted she is able to gonzalo a coat with no restrictions from the R UE  Patient noted she is continuing to have cramps in the R shoulder from time to time  Recurrent probem    Quality of life: good    Pain  Current pain ratin  At best pain ratin (resting)  At worst pain ratin (when cramping)  Location: R anterior shoulder  Quality: throbbing, cramping and sharp  Relieving factors: ice and rest  Aggravating factors: lifting and overhead activity  Progression: improved    Social Support  Steps to enter house: yes (3 BRADLY)  Lives in: [facility]  Lives with: alone    Employment status: working ([employer] RTW in November)  Hand dominance: right      Diagnostic Tests  MRI studies: abnormal  Patient Goals  Patient goals for therapy: increased strength, independence with ADLs/IADLs, return to sport/leisure activities, return to work, increased motion and decreased pain  Patient goal: "to get dressed, drive car and eat without pain " (80% MET)        Objective     Postural Observations  Seated posture: fair  Standing posture: fair  Correction of posture: makes symptoms better        Cervical/Thoracic Screen   Cervical range of motion within normal limits with the following exceptions: Patient has restriction in the following: cervical extension, sidebending and rotation       Neurological Testing     Sensation     Shoulder   Left Shoulder   Intact: light touch    Right Shoulder   Intact: light touch  Diminished: light touch    Comments   Right light touch: Diminished at C5    Active Range of Motion   Left Shoulder   Normal active range of motion    Right Shoulder   Flexion: 150 degrees   Abduction: 90 degrees   External rotation BTH: C5   Internal rotation BTB: sacrum     Passive Range of Motion   Left Shoulder   Normal passive range of motion    Right Shoulder   Flexion: 151 degrees   Abduction: 138 degrees with pain  External rotation 0°:  50 degrees   External rotation 45°:  68 degrees   Internal rotation 45°:  50 degrees     Strength/Myotome Testing     Left Shoulder     Planes of Motion   Flexion: 4+   Abduction: 4+   External rotation at 0°:  4   Internal rotation at 0°:  4+     Isolated Muscles   Biceps: 4+   Triceps: 4+     Right Shoulder     Planes of Motion   Flexion: 4   Abduction: 4   External rotation at 0°:  4-   Internal rotation at 0°:  4     Isolated Muscles   Biceps: 4   Upper trapezius: 4       Flowsheet Rows      Most Recent Value   PT/OT G-Codes   Current Score  50   Projected Score  56             Precautions: s/p R RTC repair on 8/28/19  See protocol attached to chart  No lifting > 1# until next follow up      Manual  10/11 10/14 10/17 10/21 10/24 10/28 10/31 11/4 11/7 10/7   R shoulder PROM per precautions MW MW MW SK MW MW MW MW MW MW   Re-eval         MW    STM to R UT     R bicepstenond  MW MW MW R deltoid  MW   Posterior and inferior mobs R shoulder      Grade II  MW Grade  II  MW MW  Grade II MW  Grade  II                     Exercise Diary  10/11 10/14 10/17 10/21 10/24 10/28 10/31 11/4 11/7 10/7   UBE retro 5' 5' X-ride  5' np    5'  5'    Pendulums (lateral, fwd/back, CW, CCW) HEP x30 ea           Elbow flexion HEP            Upper trap stretch D/C            Levator scap stretch D/C            Chin tuck  2x10  5" hold  3x10  5" hold 3x10  5" hold 3x10  5" hold        Scapular retraction HEP 2x10  5" hold 3x10  5" hold 3x10 5" hold 3x10  5" hold 3x10  5" hold 3x10  5" hold 3x10  5" hold 3x10  5" hold   Shoulder ER stretch on wedge at table     10x10" hold   Table top shoulder abduction stretch  10x10" 10x10" hold    Shoulder IR/ER band     x10 ea 2x10 ea OTB  2x10 ea      Prone I and T     x10 ea 2x10 ea       pullies 5' 5' 8' 8' 8' 8' 8' 5' 8' 8'   AAROM flexion and ER standing  Flexion  & abd  2x10 Supine  flexion, ER  2x10 ea Supine  flexion, ER  3x10 ea Supine  flexion, ER  3x10 ea 2x10 ea standing flex and abd  2x10 ea standing  2x10 ea x20 ea x30 ea x30 ea   Serratus punches 2x10 with cane 2x10 with cane 3x10 3x10 3x10 3x10 2x10  1# 2x10 1#  x10   Wall walks flexion 10x10" hold held   10x10" hold 10x10" hold 10x10" hold   10x10" hold   TrA 2x10  5" hold    HEP        TrA + ball squeeze 2x10  5" hold    HEP        TrA + band GTB  2x10  5" hld    HEP        sidelying ER  nv 2x10 2x10 nv 2x10  2x10  1#      No money     x10 2x10 2x10                                    Modalities  9/4 9/9 9/11 9/19 9/23 9/25 9/30      CP PRN np 10'  post 10' post 10' post 10' post 10' post Deferred

## 2019-11-08 ENCOUNTER — TRANSCRIBE ORDERS (OUTPATIENT)
Dept: PHYSICAL THERAPY | Facility: CLINIC | Age: 64
End: 2019-11-08

## 2019-11-08 DIAGNOSIS — Z98.890 S/P RIGHT ROTATOR CUFF REPAIR: Primary | ICD-10-CM

## 2019-11-11 ENCOUNTER — APPOINTMENT (OUTPATIENT)
Dept: PHYSICAL THERAPY | Facility: CLINIC | Age: 64
End: 2019-11-11
Payer: COMMERCIAL

## 2019-11-14 ENCOUNTER — OFFICE VISIT (OUTPATIENT)
Dept: PHYSICAL THERAPY | Facility: CLINIC | Age: 64
End: 2019-11-14
Payer: COMMERCIAL

## 2019-11-14 DIAGNOSIS — Z98.890 S/P RIGHT ROTATOR CUFF REPAIR: Primary | ICD-10-CM

## 2019-11-14 PROCEDURE — 97140 MANUAL THERAPY 1/> REGIONS: CPT | Performed by: PHYSICAL THERAPIST

## 2019-11-14 PROCEDURE — 97110 THERAPEUTIC EXERCISES: CPT | Performed by: PHYSICAL THERAPIST

## 2019-11-14 NOTE — PROGRESS NOTES
Daily Note     Today's date: 2019  Patient name: Roland Gomez  :   MRN: [id number]  Referring provider: Shane Arellano MD  Dx:   Encounter Diagnosis     ICD-10-CM    1  S/P right rotator cuff repair Z98 890        Start Time:   Stop Time: 200  Total time in clinic (min): 50 minutes    Subjective: patient reports no problems since last session, she did note that she called the dr  to get a refill on her prescription for the muscle spasms she has been having  Objective: See treatment diary below      Assessment: Patient tolerated treatment session well  She had no increased symptoms throughout the session and required minimal verbal cues throughout  She demonstrates limitations in PROM mainly ER and abduction, slight improvements after manual  She will benefit from continued PT  Plan: Continue per plan of care        Precautions: s/p R RTC repair on 19  See protocol attached to chart  No lifting > 1# until next follow up      Manual  10/11 10/14 10/17 10/21 10/24 10/28 10/31 11/4 11/7 11/14   R shoulder PROM per precautions MW MW MW SK MW MW MW MW MW SK   Re-eval         MW    STM to R UT     R bicepstenond  MW MW MW R deltoid  SK   Posterior and inferior mobs R shoulder      Grade II  MW Grade  II  MW MW  Grade II MW  Grade  II SK   Gr II                    Exercise Diary  10/11 10/14 10/17 10/21 10/24 10/28 10/31 11/4 11/7 11/14   UBE retro 5' 5' X-ride  5' np    5'  5' 5'   Pendulums (lateral, fwd/back, CW, CCW) HEP x30 ea           Elbow flexion HEP            Upper trap stretch D/C            Levator scap stretch D/C            Chin tuck  2x10  5" hold  3x10  5" hold 3x10  5" hold 3x10  5" hold        Scapular retraction HEP 2x10  5" hold 3x10  5" hold 3x10 5" hold 3x10  5" hold 3x10  5" hold 3x10  5" hold  3x10  5" hold 3x10  5" hold   Shoulder ER stretch on wedge at table     10x10" hold   Table top shoulder abduction stretch  10x10" 10x10" hold table abduction stretch 10x10" hold    Shoulder IR/ER band     x10 ea 2x10 ea OTB  2x10 ea      Prone I and T     x10 ea 2x10 ea       pullies 5' 5' 8' 8' 8' 8' 8' 5' 8' 8'   AAROM flexion and ER standing  Flexion  & abd  2x10 Supine  flexion, ER  2x10 ea Supine  flexion, ER  3x10 ea Supine  flexion, ER  3x10 ea 2x10 ea standing flex and abd  2x10 ea standing  2x10 ea x20 ea x30 ea    Serratus punches 2x10 with cane 2x10 with cane 3x10 3x10 3x10 3x10 2x10  1# 2x10 1#  2x10  1#    Wall walks flexion 10x10" hold held   10x10" hold 10x10" hold 10x10" hold   10x10" hold flex&abd   TrA 2x10  5" hold    HEP        TrA + ball squeeze 2x10  5" hold    HEP        TrA + band GTB  2x10  5" hld    HEP        sidelying ER  nv 2x10 2x10 nv 2x10  2x10  1#      No money     x10 2x10 2x10                                    Modalities  9/4 9/9 9/11 9/19 9/23 9/25 9/30      CP PRN np 10'  post 10' post 10' post 10' post 10' post Deferred

## 2019-11-18 ENCOUNTER — OFFICE VISIT (OUTPATIENT)
Dept: PHYSICAL THERAPY | Facility: CLINIC | Age: 64
End: 2019-11-18
Payer: COMMERCIAL

## 2019-11-18 DIAGNOSIS — Z98.890 S/P RIGHT ROTATOR CUFF REPAIR: Primary | ICD-10-CM

## 2019-11-18 PROCEDURE — 97110 THERAPEUTIC EXERCISES: CPT

## 2019-11-18 PROCEDURE — 97112 NEUROMUSCULAR REEDUCATION: CPT

## 2019-11-18 PROCEDURE — 97140 MANUAL THERAPY 1/> REGIONS: CPT

## 2019-11-18 NOTE — PROGRESS NOTES
Daily Note     Today's date: 2019  Patient name: Bacilio Nash  :   MRN: [id number]  Referring provider: Paras Georges MD  Dx:   Encounter Diagnosis     ICD-10-CM    1  S/P right rotator cuff repair Z98 890        Start Time:   Stop Time: 1140  Total time in clinic (min): 60 minutes    Subjective: Patient noted that she has been making a lot of progress with shoulder IR  Patient noted she has a follow up appointment with Dr Fredis Cabrera on Thursday  Objective: See treatment diary below      Assessment: PT noted improvements in PROM post manuals  Patient continues to improve with strength 2* increased reps and no pain  Although the patient is able to perform ADLs, she needs to continue strength training for her job  Patient would benefit from continued PT to allow the patient to return to her PLOF  Plan: Continue per plan of care        Precautions: s/p R RTC repair on 19  See protocol attached to chart  No lifting > 1# until next follow up      Manual  11/18 10/14 10/17 10/21 10/24 10/28 10/31 11/4 11/7 11/14   R shoulder PROM per precautions MW MW MW SK MW MW MW MW MW SK   Re-eval         MW    STM to R UT     R bicepstenond  MW MW MW R deltoid  SK   Posterior and inferior mobs R shoulder MW  Grade  II     Grade II  MW Grade  II  MW MW  Grade II MW  Grade  II SK   Gr II                    Exercise Diary  11/18 10/14 10/17 10/21 10/24 10/28 10/31 11/4 11/7 11/14   UBE retro 5' 5' X-ride  5' np    5'  5' 5'   Chin tuck    3x10  5" hold 3x10  5" hold 3x10  5" hold        Scapular retraction 3x10  5" hold 2x10  5" hold 3x10  5" hold 3x10 5" hold 3x10  5" hold 3x10  5" hold 3x10  5" hold  3x10  5" hold 3x10  5" hold   Table abduction stretch 10x10" hold    10x10" hold   Table top shoulder abduction stretch  10x10" 10x10" hold table abduction stretch 10x10" hold    Shoulder IR/ER band     x10 ea 2x10 ea OTB  2x10 ea      Prone I and T 2x10 ea    x10 ea 2x10 ea       pullies 5' 5' 8' 8' 8' 8' 8' 5' 8' 8'   AAROM flexion and ER  Supine  flexion, ER  2x10 ea Supine  flexion, ER  3x10 ea Supine  flexion, ER  3x10 ea 2x10 ea standing flex and abd  2x10 ea standing  2x10 ea x20 ea x30 ea    Serratus punches 3x10  1# 2x10 with cane 3x10 3x10 3x10 3x10 2x10  1# 2x10 1#  2x10  1#    Wall walks flexion  held   10x10" hold 10x10" hold 10x10" hold   10x10" hold flex&abd   TrA     HEP        TrA + ball squeeze     HEP        TrA + band     HEP        sidelying ER 2x10  1# nv 2x10 2x10 nv 2x10  2x10  1#      No money     x10 2x10 2x10                                    Modalities  9/4 9/9 9/11 9/19 9/23 9/25 9/30      CP PRN np 10'  post 10' post 10' post 10' post 10' post Deferred

## 2019-11-21 ENCOUNTER — OFFICE VISIT (OUTPATIENT)
Dept: PHYSICAL THERAPY | Facility: CLINIC | Age: 64
End: 2019-11-21
Payer: COMMERCIAL

## 2019-11-21 DIAGNOSIS — Z98.890 S/P RIGHT ROTATOR CUFF REPAIR: Primary | ICD-10-CM

## 2019-11-21 PROCEDURE — 97140 MANUAL THERAPY 1/> REGIONS: CPT

## 2019-11-21 PROCEDURE — 97112 NEUROMUSCULAR REEDUCATION: CPT

## 2019-11-21 PROCEDURE — 97110 THERAPEUTIC EXERCISES: CPT

## 2019-11-21 NOTE — PROGRESS NOTES
Daily Note     Today's date: 2019  Patient name: Bacilio Nash  :   MRN: [id number]  Referring provider: Paras Georges MD  Dx:   Encounter Diagnosis     ICD-10-CM    1  S/P right rotator cuff repair Z98 890        Start Time: 1400  Stop Time: 1450  Total time in clinic (min): 50 minutes    Subjective: Patient noted she had her follow up appointment and Dr Mcneal Nail was happy with the progress  Patient is returning to work on 19 with lifting restrictions of 5-7#s  Patient noted that she is not doing what she used to at work for right now  Objective: See treatment diary below      Assessment: PT introduced additional strengthening exercises to assist c return to work  PT educated the patient on body mechanics when seated at work  Patient required min VCs for strengthening exercises  Patient continues to improve with PROM and continues to feel relief post mobilizations  Patient would benefit from continued PT to allow the patient to return to her PLOF  Plan: Continue per plan of care        Precautions: s/p R RTC repair on 19  See protocol attached to chart  No lifting > 1# until next follow up      Manual  11/18 11/21 10/17 10/21 10/24 10/28 10/31 11/4 11/7 11/14   R shoulder PROM per precautions MW MW MW SK MW MW MW MW MW SK   Re-eval         MW    STM to R UT     R bicepstenond  MW MW MW R deltoid  SK   Posterior and inferior mobs R shoulder MW  Grade  II MW  Grade  III    Grade II  MW Grade  II  MW MW  Grade II MW  Grade  II SK   Gr II                    Exercise Diary  11/18 11/21 10/17 10/21 10/24 10/28 10/31 11/4 11/7 11/14   UBE retro 5' 10' X-ride  5' np    5'  5' 5'   Chin tuck    3x10  5" hold 3x10  5" hold 3x10  5" hold        Scapular retraction 3x10  5" hold HEP 3x10  5" hold 3x10 5" hold 3x10  5" hold 3x10  5" hold 3x10  5" hold  3x10  5" hold 3x10  5" hold   Table abduction stretch 10x10" hold    10x10" hold   Table top shoulder abduction stretch  10x10" 10x10" hold table abduction stretch 10x10" hold    Shoulder IR/ER band  OTB  2x10 ea   x10 ea 2x10 ea OTB  2x10 ea      Prone I and T 2x10 ea    x10 ea 2x10 ea       pullies 5' 5' 8' 8' 8' 8' 8' 5' 8' 8'   Rows and extensions  2x10 ea  Lime           Serratus punches 3x10  1# 3x10  2# 3x10 3x10 3x10 3x10 2x10  1# 2x10 1#  2x10  1#    Wall walks flexion     10x10" hold 10x10" hold 10x10" hold   10x10" hold flex&abd   TrA     HEP        TrA + ball squeeze     HEP        TrA + band     HEP        sidelying ER 2x10  1#  2x10 2x10 nv 2x10  2x10  1#      No money     x10 2x10 2x10                                    Modalities  9/4 9/9 9/11 9/19 9/23 9/25 9/30      CP PRN np 10'  post 10' post 10' post 10' post 10' post Deferred

## 2019-11-26 ENCOUNTER — OFFICE VISIT (OUTPATIENT)
Dept: PHYSICAL THERAPY | Facility: CLINIC | Age: 64
End: 2019-11-26
Payer: COMMERCIAL

## 2019-11-26 DIAGNOSIS — Z98.890 S/P RIGHT ROTATOR CUFF REPAIR: Primary | ICD-10-CM

## 2019-11-26 PROCEDURE — 97112 NEUROMUSCULAR REEDUCATION: CPT

## 2019-11-26 PROCEDURE — 97110 THERAPEUTIC EXERCISES: CPT

## 2019-11-26 PROCEDURE — 97140 MANUAL THERAPY 1/> REGIONS: CPT

## 2019-11-26 NOTE — PROGRESS NOTES
Daily Note     Today's date: 2019  Patient name: Shyann Lopes  :   MRN: [id number]  Referring provider: Dante Suarez MD  Dx:   Encounter Diagnosis     ICD-10-CM    1  S/P right rotator cuff repair Z98 890        Start Time: 1004  Stop Time: 9  Total time in clinic (min): 44 minutes    Subjective: Patient noted that she hung lights and decorations on her ceiling without difficulty on the R UE  Patient noted she is feeling a lot of pain on the L side  Objective: See treatment diary below      Assessment: Patient continues to progress with AROM and PROM post manuals  Patient continues to demonstrate improvements with R UE 2* using her R UE as her dominant arm  Patient performed a majority of exercises only on the R 2* pain on the L  Patient would benefit from continued PT to allow the patient to return to her PLOF  Plan: Continue per plan of care        Precautions: s/p R RTC repair on 19  See protocol attached to chart  No lifting > 1# until next follow up      Manual  11/18 11/21 11/26 10/21 10/24 10/28 10/31 11/4 11/7 11/14   R shoulder PROM per precautions MW MW MW SK MW MW MW MW MW SK   Re-eval         MW    STM to R UT     R bicepstenond  MW MW MW R deltoid  SK   Posterior and inferior mobs R shoulder MW  Grade  II MW  Grade  III MW  Grade  III   Grade II  MW Grade  II  MW MW  Grade II MW  Grade  II SK   Gr II                    Exercise Diary  11/18 11/21 11/26 10/21 10/24 10/28 10/31 11/4 11/7 11/14   UBE retro 5' 10' 10' np    5'  5' 5'   Chin tuck     3x10  5" hold 3x10  5" hold        Scapular retraction 3x10  5" hold HEP  3x10 5" hold 3x10  5" hold 3x10  5" hold 3x10  5" hold  3x10  5" hold 3x10  5" hold   Table abduction stretch 10x10" hold    10x10" hold   Table top shoulder abduction stretch  10x10" 10x10" hold table abduction stretch 10x10" hold    Shoulder IR/ER band  OTB  2x10 ea OTB  3x10 ea  x10 ea 2x10 ea OTB  2x10 ea      Prone I and T 2x10 ea  x10 R ea x10 ea 2x10 ea       pullies 5' 5' 5' 8' 8' 8' 8' 5' 8' 8'   Rows and extensions  2x10 ea  Lime 3x10   ea  Lime          Serratus punches 3x10  1# 3x10  2# 3x10  2# 3x10 3x10 3x10 2x10  1# 2x10 1#  2x10  1#    Wall walks flexion   10x10" abduction  10x10" hold 10x10" hold 10x10" hold   10x10" hold flex&abd   TrA     HEP        TrA + ball squeeze     HEP        TrA + band     HEP        sidelying ER 2x10  1#   2x10 nv 2x10  2x10  1#      No money   x10  x10 2x10 2x10                                    Modalities  9/4 9/9 9/11 9/19 9/23 9/25 9/30      CP PRN np 10'  post 10' post 10' post 10' post 10' post Deferred

## 2019-12-02 ENCOUNTER — OFFICE VISIT (OUTPATIENT)
Dept: PHYSICAL THERAPY | Facility: CLINIC | Age: 64
End: 2019-12-02
Payer: COMMERCIAL

## 2019-12-02 DIAGNOSIS — Z98.890 S/P RIGHT ROTATOR CUFF REPAIR: Primary | ICD-10-CM

## 2019-12-02 PROCEDURE — 97112 NEUROMUSCULAR REEDUCATION: CPT

## 2019-12-02 PROCEDURE — 97110 THERAPEUTIC EXERCISES: CPT

## 2019-12-02 PROCEDURE — 97140 MANUAL THERAPY 1/> REGIONS: CPT

## 2019-12-02 NOTE — PROGRESS NOTES
Daily Note     Today's date: 2019  Patient name: Aubrie Park  :   MRN: [id number]  Referring provider: Ervin Fuller MD  Dx:   Encounter Diagnosis     ICD-10-CM    1  S/P right rotator cuff repair Z98 890        Start Time: 1000  Stop Time: 1100  Total time in clinic (min): 60 minutes    Subjective: Patient noted she went back to work and noted that she did not have pain however did feel fatigue  Patient noted that she had a vertigo episode from looking up and down at work  Patient noted her upper to middle back has been really tight  Objective: See treatment diary below      Assessment: Patient continues to improve with strength each session, however endurance continues to be limited  Patient required min VCs for shoulder IR/ER band exercise, however was able to increase in resistance  PT performed grade V thoracic screw technique to assist c thoracic pain  Patient noted immediate decrease in pain post thrust  Patient noted that she has had pain their since the surgery  Patient would benefit from continued PT to allow the patient to return to her PLOF  Plan: Continue per plan of care        Precautions: s/p R RTC repair on 19  See protocol attached to chart  No lifting > 1# until next follow up      Manual  11/18 11/21 11/26 12/2 10/24 10/28 10/31 11/4 11/7 11/14   R shoulder PROM per precautions MW MW MW MW MW MW MW MW MW SK   Re-eval         MW    STM to R UT     R bicepstenond  MW MW MW R deltoid  SK   Posterior and inferior mobs R shoulder MW  Grade  II MW  Grade  III MW  Grade  III MW  Grade  III  Grade II  MW Grade  II  MW MW  Grade II MW  Grade  II SK   Gr II   Grade V thoracic screw technique, prone    MW             Exercise Diary  11/18 11/21 11/26 12/2 10/24 10/28 10/31 11/4 11/7 11/14   UBE retro 5' 10' 10' 8'    5'  5' 5'   Chin tuck      3x10  5" hold        Scapular retraction 3x10  5" hold HEP   3x10  5" hold 3x10  5" hold 3x10  5" hold  3x10  5" hold 3x10  5" hold   Table abduction stretch 10x10" hold    10x10" hold   Table top shoulder abduction stretch  10x10" 10x10" hold table abduction stretch 10x10" hold    Shoulder IR/ER band  OTB  2x10 ea OTB  3x10 ea GTB  2x10 ea x10 ea 2x10 ea OTB  2x10 ea      Prone I and T 2x10 ea  x10 R ea standing I, Y,T  2x10 ea x10 ea 2x10 ea       pullies 5' 5' 5' 5' 8' 8' 8' 5' 8' 8'   Rows and extensions  2x10 ea  Lime 3x10   ea  Lime 3x10 ea  Lime         Serratus punches 3x10  1# 3x10  2# 3x10  2# 3x10  2# 3x10 3x10 2x10  1# 2x10 1#  2x10  1#    Wall walks flexion   10x10" abduction  10x10" hold 10x10" hold 10x10" hold   10x10" hold flex&abd   TrA     HEP        TrA + ball squeeze     HEP        TrA + band     HEP        sidelying ER 2x10  1#    nv 2x10  2x10  1#      No money   x10 x10 x10 2x10 2x10                                    Modalities  9/4 9/9 9/11 9/19 9/23 9/25 9/30      CP PRN np 10'  post 10' post 10' post 10' post 10' post Deferred

## 2019-12-04 ENCOUNTER — OFFICE VISIT (OUTPATIENT)
Dept: PHYSICAL THERAPY | Facility: CLINIC | Age: 64
End: 2019-12-04
Payer: COMMERCIAL

## 2019-12-04 ENCOUNTER — APPOINTMENT (OUTPATIENT)
Dept: PHYSICAL THERAPY | Facility: CLINIC | Age: 64
End: 2019-12-04
Payer: COMMERCIAL

## 2019-12-04 DIAGNOSIS — Z98.890 S/P RIGHT ROTATOR CUFF REPAIR: Primary | ICD-10-CM

## 2019-12-04 PROCEDURE — 97140 MANUAL THERAPY 1/> REGIONS: CPT

## 2019-12-04 PROCEDURE — 97112 NEUROMUSCULAR REEDUCATION: CPT

## 2019-12-04 PROCEDURE — 97110 THERAPEUTIC EXERCISES: CPT

## 2019-12-04 NOTE — PROGRESS NOTES
Daily Note     Today's date: 2019  Patient name: Lynn To  :   MRN: [id number]  Referring provider: Shyanne Leiva MD  Dx:   Encounter Diagnosis     ICD-10-CM    1  S/P right rotator cuff repair Z98 890        Start Time: 1150  Stop Time: 5303  Total time in clinic (min): 53 minutes    Subjective: Patient noted that each day the shoulder is feeling better  Patient noted the thoracic spine feels a lot better since last session  Objective: See treatment diary below      Assessment: Patient continued to benefit from the grade V thrust to the thoracic spine for pain control  Patient continues to improve with endurance 2* increased reps without pain  Patient required min VCs for form throughout the session and cues for breathing  Patient would benefit from continued PT to allow the patient to return to her PLOF  Plan: Continue per plan of care        Precautions: s/p R RTC repair on 19  See protocol attached to chart  No lifting > 1# until next follow up      Manual  11/18 11/21 11/26 12/2 12/4 10/28 10/31 11/4 11/7 11/14   R shoulder PROM per precautions MW MW MW MW MW MW MW MW MW SK   Re-eval         MW    STM to R UT      MW MW R deltoid  SK   Posterior and inferior mobs R shoulder MW  Grade  II MW  Grade  III MW  Grade  III MW  Grade  III MW  Grade  III Grade II  MW Grade  II  MW MW  Grade II MW  Grade  II SK   Gr II   Grade V thoracic screw technique, prone    [facility], [facility]            Exercise Diary  11/18 11/21 11/26 12/2 12/4 10/28 10/31 11/4 11/7 11/14   UBE retro 5' 10' 10' 8' 10'  lvl 2   5'  5' 5'   Chin tuck              Scapular retraction 3x10  5" hold HEP    3x10  5" hold 3x10  5" hold  3x10  5" hold 3x10  5" hold   Table abduction stretch 10x10" hold       Table top shoulder abduction stretch  10x10" 10x10" hold table abduction stretch 10x10" hold    Shoulder IR/ER band  OTB  2x10 ea OTB  3x10 ea GTB  2x10 ea GTB  2x10 ea 2x10 ea OTB  2x10 ea      Prone I and T 2x10 ea x10 R ea standing I, Y,T  2x10 ea 2x10 ea 2x10 ea       pullies 5' 5' 5' 5' 5' 8' 8' 5' 8' 8'   Rows and extensions  2x10 ea  Lime 3x10   ea  Lime 3x10 ea  Lime 3x10  Ea  Lime        Serratus punches 3x10  1# 3x10  2# 3x10  2# 3x10  2# 3x10  2# 3x10 2x10  1# 2x10 1#  2x10  1#    Wall walks flexion   10x10" abduction   10x10" hold 10x10" hold   10x10" hold flex&abd   TrA     HEP        TrA + ball squeeze     HEP        TrA + band     HEP        sidelying ER 2x10  1#     2x10  2x10  1#      No money   x10 x10 2x10 OTB 2x10 2x10                                    Modalities  9/4 9/9 9/11 9/19 9/23 9/25 9/30      CP PRN np 10'  post 10' post 10' post 10' post 10' post Deferred

## 2019-12-05 ENCOUNTER — APPOINTMENT (OUTPATIENT)
Dept: PHYSICAL THERAPY | Facility: CLINIC | Age: 64
End: 2019-12-05
Payer: COMMERCIAL

## 2019-12-09 ENCOUNTER — OFFICE VISIT (OUTPATIENT)
Dept: PHYSICAL THERAPY | Facility: CLINIC | Age: 64
End: 2019-12-09
Payer: COMMERCIAL

## 2019-12-09 DIAGNOSIS — Z98.890 S/P RIGHT ROTATOR CUFF REPAIR: Primary | ICD-10-CM

## 2019-12-09 PROCEDURE — 97140 MANUAL THERAPY 1/> REGIONS: CPT

## 2019-12-09 PROCEDURE — 97110 THERAPEUTIC EXERCISES: CPT

## 2019-12-10 NOTE — PROGRESS NOTES
Daily Note     Today's date: 2019  Patient name: Aubrie Park  :   MRN: [id number]  Referring provider: Ervin Fuller MD  Dx:   Encounter Diagnosis     ICD-10-CM    1  S/P right rotator cuff repair Z98 890        Start Time: 1100  Stop Time: 8543  Total time in clinic (min): 45 minutes    Subjective: Patient noted increased fatigue 2* going back to work this past week  Patient noted that she corrected her posture at work when looking at the microscope with a little less pain  Objective: See treatment diary below      Assessment: Session focused on manual techniques and cardio due to patient's fatigue  Patient continues to demonstrate improvements in ROM post manuals  Patient will resume normal routine next session  Patient would benefit from continued PT to allow the patient to return to her PLOF  Plan: Continue per plan of care        Precautions: s/p R RTC repair on 19  See protocol attached to chart  No lifting > 1# until next follow up      Manual  11/18 11/21 11/26 12/2 12/4 12/9 10/31 11/4 11/7 11/14   R shoulder PROM per precautions MW MW MW MW MW MW MW MW MW SK   Re-eval         MW    STM to R UT      MW sub-ccipital release MW R deltoid  SK   Posterior and inferior mobs R shoulder MW  Grade  II MW  Grade  III MW  Grade  III MW  Grade  III MW  Grade  III Grade II  MW Grade  II  MW MW  Grade II MW  Grade  II SK   Gr II   Grade V thoracic screw technique, prone    [facility], [facility]            Exercise Diary  11/18 11/21 11/26 12/2 12/4 12/9 10/31 11/4 11/7 11/14   UBE retro 5' 10' 10' 8' 10'  lvl 2 10'  lvl 1  5'  5' 5'   Chin tuck              Scapular retraction 3x10  5" hold HEP     3x10  5" hold  3x10  5" hold 3x10  5" hold   Table abduction stretch 10x10" hold       Table top shoulder abduction stretch  10x10" 10x10" hold table abduction stretch 10x10" hold    Shoulder IR/ER band  OTB  2x10 ea OTB  3x10 ea GTB  2x10 ea GTB  2x10 ea  OTB  2x10 ea      Prone I and T 2x10 ea  x10 R ea standing I, Y,T  2x10 ea 2x10 ea        pullies 5' 5' 5' 5' 5' 5' 8' 5' 8' 8'   Rows and extensions  2x10 ea  Lime 3x10   ea  Lime 3x10 ea  Lime 3x10  Ea  Lime        Serratus punches 3x10  1# 3x10  2# 3x10  2# 3x10  2# 3x10  2#  2x10  1# 2x10 1#  2x10  1#    Wall walks flexion   10x10" abduction    10x10" hold   10x10" hold flex&abd   TrA     HEP        TrA + ball squeeze     HEP        TrA + band     HEP        sidelying ER 2x10  1#      2x10  1#      No money   x10 x10 2x10 OTB  2x10                                    Modalities  9/4 9/9 9/11 9/19 9/23 9/25 9/30      CP PRN np 10'  post 10' post 10' post 10' post 10' post Deferred

## 2019-12-12 ENCOUNTER — OFFICE VISIT (OUTPATIENT)
Dept: PHYSICAL THERAPY | Facility: CLINIC | Age: 64
End: 2019-12-12
Payer: COMMERCIAL

## 2019-12-12 DIAGNOSIS — Z98.890 S/P RIGHT ROTATOR CUFF REPAIR: Primary | ICD-10-CM

## 2019-12-12 PROCEDURE — 97110 THERAPEUTIC EXERCISES: CPT

## 2019-12-12 PROCEDURE — 97140 MANUAL THERAPY 1/> REGIONS: CPT

## 2019-12-12 NOTE — PROGRESS NOTES
Daily Note     Today's date: 2019  Patient name: Kurtis Wilson  :   MRN: [id number]  Referring provider: Aminata Gutiérrez MD  Dx:   Encounter Diagnosis     ICD-10-CM    1  S/P right rotator cuff repair Z98 890        Start Time: 1700  Stop Time: 4365  Total time in clinic (min): 55 minutes    Subjective: Patient noted she continues to be fatigued after work  Patient noted she continues to be impressed with her R shoulder ROM  Patient noted she follows up with the doctor next week  Patient noted some back pain/ hip pain due to not sleeping right because of the RTC sx  Objective: See treatment diary below      Assessment: PT educated the patient on the importance of posture during work activities as well as the use of a tennis ball for massae for the back pain  Patient continues to improve with endurance 2* increased reps without pain  Patient required cues for posture throughout the session  PT  Patient would benefit from continued PT to allow the patient to return to her PLOF  Plan: Continue per plan of care  Re-eval at next session        Precautions: s/p R RTC repair on 19  See protocol attached to chart        Manual     R shoulder PROM  MW MW MW MW MW MW MW MW MW SK   Re-eval         MW    STM to R UT      MW sub-ccipital release MW R deltoid  SK   Posterior and inferior mobs R shoulder MW  Grade  II MW  Grade  III MW  Grade  III MW  Grade  III MW  Grade  III Grade II  MW Grade  II  MW MW  Grade II MW  Grade  II SK   Gr II   Grade V thoracic screw technique, prone    MW MW  Tennis ball STM to low back MW          Exercise Diary     UBE retro 5' 10' 10' 8' 10'  lvl 2 10'  lvl 1 10'  lvl 2 5'  5' 5'   Chin tuck              Scapular retraction 3x10  5" hold HEP       3x10  5" hold 3x10  5" hold   Table abduction stretch 10x10" hold       Table top shoulder abduction stretch  10x10" 10x10" hold table abduction stretch 10x10" hold    Shoulder IR/ER band  OTB  2x10 ea OTB  3x10 ea GTB  2x10 ea GTB  2x10 ea  GTB  3x10 ea      Prone I and T 2x10 ea  x10 R ea standing I, Y,T  2x10 ea 2x10 ea  2x10 ea      pullies 5' 5' 5' 5' 5' 5' 5' 5' 8' 8'   Rows and extensions  2x10 ea  Lime 3x10   ea  Lime 3x10 ea  Lime 3x10  Ea  Lime  Lime  3x10  ea      Serratus punches 3x10  1# 3x10  2# 3x10  2# 3x10  2# 3x10  2#  3x10  2# 2x10 1#  2x10  1#    Wall walks flexion   10x10" abduction       10x10" hold flex&abd   TrA     HEP        TrA + ball squeeze     HEP        TrA + band     HEP        sidelying ER 2x10  1#            No money   x10 x10 2x10 OTB  3x10  OTB                                    Modalities  9/4 9/9 9/11 9/19 9/23 9/25 9/30      CP PRN np 10'  post 10' post 10' post 10' post 10' post Deferred

## 2019-12-16 ENCOUNTER — TRANSCRIBE ORDERS (OUTPATIENT)
Dept: PHYSICAL THERAPY | Facility: CLINIC | Age: 64
End: 2019-12-16

## 2019-12-16 ENCOUNTER — EVALUATION (OUTPATIENT)
Dept: PHYSICAL THERAPY | Facility: CLINIC | Age: 64
End: 2019-12-16
Payer: COMMERCIAL

## 2019-12-16 DIAGNOSIS — Z98.890 S/P RIGHT ROTATOR CUFF REPAIR: Primary | ICD-10-CM

## 2019-12-16 PROCEDURE — 97140 MANUAL THERAPY 1/> REGIONS: CPT

## 2019-12-16 PROCEDURE — 97110 THERAPEUTIC EXERCISES: CPT

## 2019-12-16 NOTE — LETTER
2019    Zoe Quan MD  [facility]    Patient: Allison Ling   YOB: 1955   Date of Visit: 2019     Encounter Diagnosis     ICD-10-CM    1  S/P right rotator cuff repair [id number]        Dear Dr Nj Aden: Thank you for your recent referral of Allison Ling  Please review the attached evaluation summary from Mary Beth's recent visit  Please verify that you agree with the plan of care by signing the attached order  If you have any questions or concerns, please do not hesitate to call  I sincerely appreciate the opportunity to share in the care of one of your patients and hope to have another opportunity to work with you in the near future  Sincerely,    Marissa Snyder, PT      Referring Provider:      I certify that I have read the below Plan of Care and certify the need for these services furnished under this plan of treatment while under my care  Zoe Quan MD  [address] [facility] [address]: [phone]          PT Re-Evaluation     Today's date: 2019  Patient name: Allison Ling  : [birth year]  MRN: [id number]  Referring provider: Violette Pineda MD  Dx:   Encounter Diagnosis     ICD-10-CM    1  S/P right rotator cuff repair Z98 890        Start Time: 1100  Stop Time: 3876  Total time in clinic (min): 45 minutes    Assessment  Assessment details: Since last re-evaluation, the patient has improved with AROM, PROM, strength, endurance and pain levels  Patient is able to perform all ADLs, however does have some pain when articles of clothing are placed on the shoulder for a 12 hour shift  Patient noted she has more pain with the L shoulder  Patient continues to be limited in shoulder external rotation strength and endurance   PT will continue to address the noted impairments by performing shoulder and scapular strengthening, stretching, posture training, functional activities and manual techniques to allow the patient to return to her PLOF  PT recommended 1x/week for 2 weeks c a good prognosis 2* noted improvements  Impairments: abnormal or restricted ROM, impaired physical strength, lacks appropriate home exercise program and poor posture     Symptom irritability: lowUnderstanding of Dx/Px/POC: good   Prognosis: good    Goals  STG: In three weeks the patient will:    1  Be (I) with her HEP  (75% MET)  2  Increase PROM of the R shoulderto 90 degrees of flexion  (MET)  3  Increase PROM of the R shoulderto 90 degrees of abduction  (MET)  4  Increase PROM of the R shoulder to 45 degrees of ER and 20 degrees of IR  (MET)      LTG: In six weeks, the patient will:    1  Increase FOTO score to 56 to demonstrate improvements in symptoms and function  (MET)  2  Demonstrate R shoudler PROM to the followin degrees of flexion and abduction, ER to 60-90 degrees and IR to 30 degrees (MET)  3  D/C sling  (MET)    In 6-8 week the patient will:   1  Increase R shoulder full PROM to the followin degrees of flexion and abduction; 90 degrees of ER and 30 degrees of IR  (MET)  2  Increase strength to 4+/5 in scapular strength to assist c ADLs  (75% MET)  3  Return to work with out pain  (75% MET)  4  Increase strength to 5/5 in R shoulder and scapular strength to assist c work related activities   (50% MET)        Plan  Patient would benefit from: skilled physical therapy  Referral necessary: No  Planned modality interventions: cryotherapy and thermotherapy: hydrocollator packs  Planned therapy interventions: abdominal trunk stabilization, joint mobilization, manual therapy, massage, Silveira taping, neuromuscular re-education, patient education, postural training, strengthening, stretching, therapeutic activities, therapeutic exercise, home exercise program, functional ROM exercises and body mechanics training  Frequency: 1x week  Duration in visits: 2  Duration in weeks: 2  Plan of Care beginning date: 2019  Plan of Care expiration date: 2019  Treatment plan discussed with: patient        Subjective Evaluation    History of Present Illness  Mechanism of injury: Patient noted that she is 85% back to her PLOF  Patient noted she does not have a lot of pain in the shoulder anymore, however she does have pain when her bra in on for a 12 hour shift  Patient noted reaching and lifting continues to improve  Patient noted she is very fatigued returning to work  Recurrent probem    Quality of life: good    Pain  Current pain ratin  At best pain ratin (resting)  At worst pain rating: 3 (when wearing a bra)  Location: R anterior shoulder  Quality: dull ache  Relieving factors: ice and rest  Aggravating factors: lifting and overhead activity  Progression: improved    Social Support  Steps to enter house: yes (3 BRADLY)  L[facility] in: [facility]  Lives with: alone    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)  Hand dominance: right      Diagnostic Tests  MRI studies: abnormal  Patient Goals  Patient goals for therapy: increased strength and decreased pain  Patient goal: "to get dressed, drive car and eat without pain " (100% MET)        Objective     Postural Observations  Seated posture: fair  Standing posture: fair  Correction of posture: makes symptoms better        Cervical/Thoracic Screen   Cervical range of motion within normal limits with the following exceptions: Patient has restriction in the following: cervical extension, sidebending and rotation       Neurological Testing     Sensation     Shoulder   Left Shoulder   Intact: light touch    Right Shoulder   Intact: light touch  Diminished: light touch    Comments   Right light touch: Diminished at C5    Active Range of Motion   Left Shoulder   Normal active range of motion    Right Shoulder   Flexion: 165 degrees   Abduction: 150 degrees   External rotation BTH: C7 Internal rotation BTB: T11     Passive Range of Motion   Left Shoulder   Normal passive range of motion    Right Shoulder   Flexion: 175 degrees   Abduction: 175 degrees   External rotation 0°:  80 degrees   External rotation 45°:  80 degrees   Internal rotation 45°:  50 degrees     Strength/Myotome Testing     Left Shoulder     Planes of Motion   Flexion: 4+   Abduction: 4+   External rotation at 0°:  4   Internal rotation at 0°:  4+     Isolated Muscles   Biceps: 4+   Triceps: 4+     Right Shoulder     Planes of Motion   Flexion: 4+   Abduction: 4+   External rotation at 0°:  4-   Internal rotation at 0°:  4+     Isolated Muscles   Biceps: 4+   Triceps: 4+              Precautions: s/p R RTC repair on 8/28/19  See protocol attached to chart        Manual  11/18 11/21 11/26 12/2 12/4 12/9 12/12 12/16 11/7 11/14   R shoulder PROM  MW MW MW MW MW MW MW MW MW SK   Re-eval        MW MW    STM to R UT      MW sub-ccipital release MW   SK   Posterior and inferior mobs R shoulder MW  Grade  II MW  Grade  III MW  Grade  III MW  Grade  III MW  Grade  III Grade II  MW Grade  II  MW MW  Grade II MW  Grade  II SK   Gr II   Grade V thoracic screw technique, prone    MW MW  Tennis ball STM to [facility], [facility]         Exercise Diary  11/18 11/21 11/26 12/2 12/4 12/9 12/12 12/16 11/7 11/14   UBE retro 5' 10' 10' 8' 10'  lvl 2 10'  lvl 1 10'  lvl 2 np 5' 5'   Chin tuck         2x10  5" hold     Scapular retraction 3x10  5" hold HEP       3x10  5" hold 3x10  5" hold   Table abduction stretch 10x10" hold        10x10" hold table abduction stretch 10x10" hold    Shoulder IR/ER band  OTB  2x10 ea OTB  3x10 ea GTB  2x10 ea GTB  2x10 ea  GTB  3x10 ea GTB  3x10 ea     Prone I and T 2x10 ea  x10 R ea standing I, Y,T  2x10 ea 2x10 ea  2x10 ea      pullies 5' 5' 5' 5' 5' 5' 5'  8' 8'   Rows and extensions  2x10 ea  Lime 3x10   ea  Lime 3x10 ea  Lime 3x10  Ea  Lime  Lime  3x10  ea      Serratus punches 3x10  1# 3x10  2# 3x10  2# 3x10  2# 3x10  2#  3x10  2# 3x10  2#  2x10  1#    Wall walks flexion   10x10" abduction       10x10" hold flex&abd   TrA     HEP        TrA + ball squeeze     HEP        TrA + band     HEP        sidelying ER 2x10  1#            No money   x10 x10 2x10 OTB  3x10  OTB 3x10                                   Modalities  9/4 9/9 9/11 9/19 9/23 9/25 9/30      CP PRN np 10'  post 10' post 10' post 10' post 10' post Deferred

## 2019-12-16 NOTE — PROGRESS NOTES
PT Re-Evaluation     Today's date: 2019  Patient name: Mendez Parisi  : 1955  MRN: [id number]  Referring provider: Evelina Kathleen MD  Dx:   Encounter Diagnosis     ICD-10-CM    1  S/P right rotator cuff repair Z98 890        Start Time: 1100  Stop Time: 5553  Total time in clinic (min): 45 minutes    Assessment  Assessment details: Since last re-evaluation, the patient has improved with AROM, PROM, strength, endurance and pain levels  Patient is able to perform all ADLs, however does have some pain when articles of clothing are placed on the shoulder for a 12 hour shift  Patient noted she has more pain with the L shoulder  Patient continues to be limited in shoulder external rotation strength and endurance  PT will continue to address the noted impairments by performing shoulder and scapular strengthening, stretching, posture training, functional activities and manual techniques to allow the patient to return to her PLOF  PT recommended 1x/week for 2 weeks c a good prognosis 2* noted improvements  Impairments: abnormal or restricted ROM, impaired physical strength, lacks appropriate home exercise program and poor posture     Symptom irritability: lowUnderstanding of Dx/Px/POC: good   Prognosis: good    Goals  STG: In three weeks the patient will:    1  Be (I) with her HEP  (75% MET)  2  Increase PROM of the R shoulderto 90 degrees of flexion  (MET)  3  Increase PROM of the R shoulderto 90 degrees of abduction  (MET)  4  Increase PROM of the R shoulder to 45 degrees of ER and 20 degrees of IR  (MET)      LTG: In six weeks, the patient will:    1  Increase FOTO score to 56 to demonstrate improvements in symptoms and function  (MET)  2  Demonstrate R shoudler PROM to the followin degrees of flexion and abduction, ER to 60-90 degrees and IR to 30 degrees (MET)  3  D/C sling  (MET)    In 6-8 week the patient will:   1   Increase R shoulder full PROM to the followin degrees of flexion and abduction; 90 degrees of ER and 30 degrees of IR  (MET)  2  Increase strength to 4+/5 in scapular strength to assist c ADLs  (75% MET)  3  Return to work with out pain  (75% MET)  4  Increase strength to 5/5 in R shoulder and scapular strength to assist c work related activities  (50% MET)        Plan  Patient would benefit from: skilled physical therapy  Referral necessary: No  Planned modality interventions: cryotherapy and thermotherapy: hydrocollator packs  Planned therapy interventions: abdominal trunk stabilization, joint mobilization, manual therapy, massage, Silveira taping, neuromuscular re-education, patient education, postural training, strengthening, stretching, therapeutic activities, therapeutic exercise, home exercise program, functional ROM exercises and body mechanics training  Frequency: 1x week  Duration in visits: 2  Duration in weeks: 2  Plan of Care beginning date: 2019  Plan of Care expiration date: 2019  Treatment plan discussed with: patient        Subjective Evaluation    History of Present Illness  Mechanism of injury: Patient noted that she is 85% back to her PLOF  Patient noted she does not have a lot of pain in the shoulder anymore, however she does have pain when her bra in on for a 12 hour shift  Patient noted reaching and lifting continues to improve  Patient noted she is very fatigued returning to work             Recurrent probem    Quality of life: good    Pain  Current pain ratin  At best pain ratin (resting)  At worst pain rating: 3 (when wearing a bra)  Location: R anterior shoulder  Quality: dull ache  Relieving factors: ice and rest  Aggravating factors: lifting and overhead activity  Progression: improved    Social Support  Steps to enter house: yes (3 BRADLY)  Lives in: [facility]  Lives with: alone    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)  Hand dominance: right      Diagnostic Tests  MRI studies: abnormal  Patient Goals  Patient goals for therapy: increased strength and decreased pain  Patient goal: "to get dressed, drive car and eat without pain " (100% MET)        Objective     Postural Observations  Seated posture: fair  Standing posture: fair  Correction of posture: makes symptoms better        Cervical/Thoracic Screen   Cervical range of motion within normal limits with the following exceptions: Patient has restriction in the following: cervical extension, sidebending and rotation       Neurological Testing     Sensation     Shoulder   Left Shoulder   Intact: light touch    Right Shoulder   Intact: light touch  Diminished: light touch    Comments   Right light touch: Diminished at C5    Active Range of Motion   Left Shoulder   Normal active range of motion    Right Shoulder   Flexion: 165 degrees   Abduction: 150 degrees   External rotation BTH: C7   Internal rotation BTB: T11     Passive Range of Motion   Left Shoulder   Normal passive range of motion    Right Shoulder   Flexion: 175 degrees   Abduction: 175 degrees   External rotation 0°: 80 degrees   External rotation 45°: 80 degrees   Internal rotation 45°: 50 degrees     Strength/Myotome Testing     Left Shoulder     Planes of Motion   Flexion: 4+   Abduction: 4+   External rotation at 0°: 4   Internal rotation at 0°: 4+     Isolated Muscles   Biceps: 4+   Triceps: 4+     Right Shoulder     Planes of Motion   Flexion: 4+   Abduction: 4+   External rotation at 0°: 4-   Internal rotation at 0°: 4+     Isolated Muscles   Biceps: 4+   Triceps: 4+              Precautions: s/p R RTC repair on 8/28/19  See protocol attached to chart        Manual  11/18 11/21 11/26 12/2 12/4 12/9 12/12 12/16 11/7 11/14   R shoulder PROM  MW MW MW MW MW MW MW MW MW SK   Re-eval        MW MW    STM to R UT      MW sub-ccipital release MW   SK   Posterior and inferior mobs R shoulder MW  Grade  II MW  Grade  III MW  Grade  III MW  Grade  III MW  Grade  III Grade II  MW Grade  II  MW MW  Grade II MW  Grade  II SK   Gr II   Grade V thoracic screw technique, prone    MW MW  Tennis ball STM to low back [facility], [facility]         Exercise Diary  11/18 11/21 11/26 12/2 12/4 12/9 12/12 12/16 11/7 11/14   UBE retro 5' 10' 10' 8' 10'  lvl 2 10'  lvl 1 10'  lvl 2 np 5' 5'   Chin tuck         2x10  5" hold     Scapular retraction 3x10  5" hold HEP       3x10  5" hold 3x10  5" hold   Table abduction stretch 10x10" hold        10x10" hold table abduction stretch 10x10" hold    Shoulder IR/ER band  OTB  2x10 ea OTB  3x10 ea GTB  2x10 ea GTB  2x10 ea  GTB  3x10 ea GTB  3x10 ea     Prone I and T 2x10 ea  x10 R ea standing I, Y,T  2x10 ea 2x10 ea  2x10 ea      pullies 5' 5' 5' 5' 5' 5' 5'  8' 8'   Rows and extensions  2x10 ea  Lime 3x10   ea  Lime 3x10 ea  Lime 3x10  Ea  Lime  Lime  3x10  ea      Serratus punches 3x10  1# 3x10  2# 3x10  2# 3x10  2# 3x10  2#  3x10  2# 3x10  2#  2x10  1#    Wall walks flexion   10x10" abduction       10x10" hold flex&abd   TrA     HEP        TrA + ball squeeze     HEP        TrA + band     HEP        sidelying ER 2x10  1#            No money   x10 x10 2x10 OTB  3x10  OTB 3x10                                   Modalities  9/4 9/9 9/11 9/19 9/23 9/25 9/30      CP PRN np 10'  post 10' post 10' post 10' post 10' post Deferred

## 2019-12-18 ENCOUNTER — APPOINTMENT (OUTPATIENT)
Dept: PHYSICAL THERAPY | Facility: CLINIC | Age: 64
End: 2019-12-18
Payer: COMMERCIAL

## 2019-12-18 NOTE — PROGRESS NOTES
PT Discharge    Patient is D/C from PT 2* noted improvements and patient wanted to performed her exercises at home

## 2020-01-29 ENCOUNTER — TRANSCRIBE ORDERS (OUTPATIENT)
Dept: ADMINISTRATIVE | Facility: HOSPITAL | Age: 65
End: 2020-01-29

## 2020-01-29 DIAGNOSIS — Z12.31 ENCOUNTER FOR SCREENING MAMMOGRAM FOR MALIGNANT NEOPLASM OF BREAST: Primary | ICD-10-CM

## 2020-05-18 ENCOUNTER — HOSPITAL ENCOUNTER (OUTPATIENT)
Dept: MAMMOGRAPHY | Facility: MEDICAL CENTER | Age: 65
Discharge: HOME | End: 2020-05-18
Payer: COMMERCIAL

## 2020-05-18 VITALS — WEIGHT: 164 LBS | HEIGHT: 62 IN | BODY MASS INDEX: 30.18 KG/M2

## 2020-05-18 DIAGNOSIS — Z12.31 ENCOUNTER FOR SCREENING MAMMOGRAM FOR MALIGNANT NEOPLASM OF BREAST: ICD-10-CM

## 2020-05-18 PROCEDURE — 77067 SCR MAMMO BI INCL CAD: CPT

## 2020-05-18 PROCEDURE — 77063 BREAST TOMOSYNTHESIS BI: CPT

## 2021-05-25 ENCOUNTER — OFFICE VISIT (OUTPATIENT)
Dept: FAMILY MEDICINE CLINIC | Facility: CLINIC | Age: 66
End: 2021-05-25
Payer: MEDICARE

## 2021-05-25 VITALS
OXYGEN SATURATION: 98 % | WEIGHT: 172 LBS | HEART RATE: 90 BPM | HEIGHT: 63 IN | SYSTOLIC BLOOD PRESSURE: 122 MMHG | TEMPERATURE: 98.5 F | DIASTOLIC BLOOD PRESSURE: 82 MMHG | BODY MASS INDEX: 30.48 KG/M2

## 2021-05-25 DIAGNOSIS — Z76.89 ESTABLISHING CARE WITH NEW DOCTOR, ENCOUNTER FOR: ICD-10-CM

## 2021-05-25 DIAGNOSIS — T14.8XXA PUNCTURE WOUND: Primary | ICD-10-CM

## 2021-05-25 DIAGNOSIS — Z12.31 ENCOUNTER FOR SCREENING MAMMOGRAM FOR MALIGNANT NEOPLASM OF BREAST: ICD-10-CM

## 2021-05-25 PROBLEM — J45.20 MILD INTERMITTENT ASTHMA, UNCOMPLICATED: Status: ACTIVE | Noted: 2021-05-25

## 2021-05-25 PROCEDURE — 99203 OFFICE O/P NEW LOW 30 MIN: CPT | Performed by: FAMILY MEDICINE

## 2021-05-25 PROCEDURE — 90715 TDAP VACCINE 7 YRS/> IM: CPT | Performed by: FAMILY MEDICINE

## 2021-05-25 PROCEDURE — 90471 IMMUNIZATION ADMIN: CPT | Performed by: FAMILY MEDICINE

## 2021-05-25 RX ORDER — CETIRIZINE HYDROCHLORIDE 10 MG/1
10 CAPSULE, LIQUID FILLED ORAL DAILY
COMMUNITY

## 2021-05-25 NOTE — PATIENT INSTRUCTIONS
Call if any signs of redness or swelling develops around injury on foot     Schedule Medicare wellness exam

## 2021-05-25 NOTE — PROGRESS NOTES
Assessment/Plan:     1  Puncture wound  Assessment & Plan:  No signs of infection on exam; tdap updated; advised to keep area clean and call if any signs of erythema, drainage, or swelling occurs    Orders:  -     TDAP VACCINE GREATER THAN OR EQUAL TO 6YO IM    2  Encounter for screening mammogram for malignant neoplasm of breast  Assessment & Plan:  Reviewed recommendations on mammogram; pt prefers to complete every 2 years    Orders:  -     Mammo screening bilateral w 3d & cad; Future; Expected date: 05/25/2021        Subjective:      Patient ID: Aaron Grimaldo is a 76 y o  female  Patient states on Sunday she was gardening and stepped back onto a rake  She did puncture skin  She states she did do raiki on it and seems to have helped her  Able to walk on it  Due for tdap  She put peroxide on it and cleaned it out  No currently painful  Swelling is going down and seems to be less painful  No fevers  Did not see anyone for this  No drainage  Mammogram completed last year  Pt wants to do every 2 years  Completed Cologuard last year as well  The following portions of the patient's history were reviewed and updated as appropriate: allergies, current medications, past family history, past medical history, past social history, past surgical history, and problem list     Review of Systems   Constitutional: Negative for chills and fever  Musculoskeletal: Negative for arthralgias  Skin: Positive for wound  Negative for color change  Allergic/Immunologic: Positive for environmental allergies  Objective:      /82 (BP Location: Right arm, Patient Position: Sitting)   Pulse 90   Temp 98 5 °F (36 9 °C) (Tympanic)   Ht 5' 2 5" (1 588 m)   Wt 78 kg (172 lb)   SpO2 98%   BMI 30 96 kg/m²          Physical Exam  Vitals signs reviewed  Constitutional:       General: She is not in acute distress  Appearance: Normal appearance  She is not ill-appearing, toxic-appearing or diaphoretic  HENT:      Head: Normocephalic and atraumatic  Eyes:      General: No scleral icterus  Right eye: No discharge  Left eye: No discharge  Conjunctiva/sclera: Conjunctivae normal    Cardiovascular:      Rate and Rhythm: Normal rate and regular rhythm  Pulses: Normal pulses  Heart sounds: Normal heart sounds  No murmur  No gallop  Pulmonary:      Effort: Pulmonary effort is normal  No respiratory distress  Breath sounds: Normal breath sounds  No stridor  No wheezing, rhonchi or rales  Musculoskeletal:      Right lower leg: No edema  Left lower leg: No edema  Skin:         Neurological:      General: No focal deficit present  Mental Status: She is alert and oriented to person, place, and time  Psychiatric:         Mood and Affect: Mood normal          Behavior: Behavior normal          Thought Content:  Thought content normal          Judgment: Judgment normal

## 2021-05-25 NOTE — ASSESSMENT & PLAN NOTE
No signs of infection on exam; tdap updated; advised to keep area clean and call if any signs of erythema, drainage, or swelling occurs

## 2022-06-28 ENCOUNTER — TELEPHONE (OUTPATIENT)
Dept: FAMILY MEDICINE CLINIC | Facility: CLINIC | Age: 67
End: 2022-06-28

## 2022-06-28 NOTE — TELEPHONE ENCOUNTER
Spoke to patient, she is no longer living in [place]  She has moved to [place] and we are no longer her PCP

## 2022-07-01 NOTE — TELEPHONE ENCOUNTER
06/30/22 10:31 PM     Thank you for your request  Your request has been received, reviewed, and the patient chart updated  The PCP has successfully been removed with a patient attribution note  This message will now be completed      Thank you  Kavita Jack